# Patient Record
Sex: FEMALE | Race: WHITE | NOT HISPANIC OR LATINO
[De-identification: names, ages, dates, MRNs, and addresses within clinical notes are randomized per-mention and may not be internally consistent; named-entity substitution may affect disease eponyms.]

---

## 2017-07-31 ENCOUNTER — APPOINTMENT (OUTPATIENT)
Dept: ORTHOPEDIC SURGERY | Facility: CLINIC | Age: 27
End: 2017-07-31
Payer: COMMERCIAL

## 2017-07-31 VITALS — BODY MASS INDEX: 26.98 KG/M2 | HEIGHT: 68 IN | WEIGHT: 178 LBS

## 2017-07-31 VITALS — DIASTOLIC BLOOD PRESSURE: 80 MMHG | HEART RATE: 76 BPM | SYSTOLIC BLOOD PRESSURE: 120 MMHG

## 2017-07-31 DIAGNOSIS — M54.5 LOW BACK PAIN: ICD-10-CM

## 2017-07-31 PROCEDURE — 99214 OFFICE O/P EST MOD 30 MIN: CPT

## 2017-08-02 ENCOUNTER — CLINICAL ADVICE (OUTPATIENT)
Age: 27
End: 2017-08-02

## 2017-08-28 ENCOUNTER — APPOINTMENT (OUTPATIENT)
Dept: ORTHOPEDIC SURGERY | Facility: CLINIC | Age: 27
End: 2017-08-28
Payer: COMMERCIAL

## 2017-08-28 DIAGNOSIS — Z78.9 OTHER SPECIFIED HEALTH STATUS: ICD-10-CM

## 2017-08-28 DIAGNOSIS — Z87.39 PERSONAL HISTORY OF OTHER DISEASES OF THE MUSCULOSKELETAL SYSTEM AND CONNECTIVE TISSUE: ICD-10-CM

## 2017-08-28 PROCEDURE — 99214 OFFICE O/P EST MOD 30 MIN: CPT

## 2017-08-28 RX ORDER — MULTIVITAMIN
TABLET ORAL
Refills: 0 | Status: ACTIVE | COMMUNITY

## 2019-07-12 ENCOUNTER — TRANSCRIPTION ENCOUNTER (OUTPATIENT)
Age: 29
End: 2019-07-12

## 2019-07-12 ENCOUNTER — RECORD ABSTRACTING (OUTPATIENT)
Age: 29
End: 2019-07-12

## 2019-07-12 ENCOUNTER — APPOINTMENT (OUTPATIENT)
Dept: INTERNAL MEDICINE | Facility: CLINIC | Age: 29
End: 2019-07-12
Payer: COMMERCIAL

## 2019-07-12 VITALS
SYSTOLIC BLOOD PRESSURE: 106 MMHG | WEIGHT: 184 LBS | HEART RATE: 77 BPM | DIASTOLIC BLOOD PRESSURE: 78 MMHG | HEIGHT: 68 IN | BODY MASS INDEX: 27.89 KG/M2

## 2019-07-12 DIAGNOSIS — K58.9 IRRITABLE BOWEL SYNDROME W/OUT DIARRHEA: ICD-10-CM

## 2019-07-12 DIAGNOSIS — Z83.3 FAMILY HISTORY OF DIABETES MELLITUS: ICD-10-CM

## 2019-07-12 DIAGNOSIS — Z82.49 FAMILY HISTORY OF ISCHEMIC HEART DISEASE AND OTHER DISEASES OF THE CIRCULATORY SYSTEM: ICD-10-CM

## 2019-07-12 DIAGNOSIS — Z83.438 FAMILY HISTORY OF OTHER DISORDER OF LIPOPROTEIN METABOLISM AND OTHER LIPIDEMIA: ICD-10-CM

## 2019-07-12 DIAGNOSIS — Z83.2 FAMILY HISTORY OF DISEASES OF THE BLOOD AND BLOOD-FORMING ORGANS AND CERTAIN DISORDERS INVOLVING THE IMMUNE MECHANISM: ICD-10-CM

## 2019-07-12 DIAGNOSIS — Z86.79 PERSONAL HISTORY OF OTHER DISEASES OF THE CIRCULATORY SYSTEM: ICD-10-CM

## 2019-07-12 DIAGNOSIS — D72.829 ELEVATED WHITE BLOOD CELL COUNT, UNSPECIFIED: ICD-10-CM

## 2019-07-12 PROCEDURE — 99213 OFFICE O/P EST LOW 20 MIN: CPT | Mod: 25

## 2019-07-12 PROCEDURE — 36415 COLL VENOUS BLD VENIPUNCTURE: CPT

## 2019-07-12 NOTE — HISTORY OF PRESENT ILLNESS
[FreeTextEntry1] : High WBC  [de-identified] : Had blood work done at [facility] and WBC was 11,000 with a normal of up to 10, 800. All other blood work was normal including CRP. She feels fine but is going on vaction next week and will be trying to conceive--sees Dr. Amy Nolasco. \par

## 2019-07-12 NOTE — PHYSICAL EXAM
[No Acute Distress] : no acute distress [Normal Sclera/Conjunctiva] : normal sclera/conjunctiva [Well Nourished] : well nourished [Normal Oropharynx] : the oropharynx was normal [No JVD] : no jugular venous distention [Normal TMs] : both tympanic membranes were normal [Supple] : supple [No Lymphadenopathy] : no lymphadenopathy [No Respiratory Distress] : no respiratory distress  [No Accessory Muscle Use] : no accessory muscle use [Clear to Auscultation] : lungs were clear to auscultation bilaterally [Normal Rate] : normal rate  [Regular Rhythm] : with a regular rhythm [Normal S1, S2] : normal S1 and S2 [No Edema] : there was no peripheral edema [Soft] : abdomen soft [Non Tender] : non-tender [No HSM] : no HSM [Non-distended] : non-distended [Normal Axillary Nodes] : no axillary lymphadenopathy [Normal Supraclavicular Nodes] : no supraclavicular lymphadenopathy [Normal Anterior Cervical Nodes] : no anterior cervical lymphadenopathy [Normal Femoral Nodes] : no femoral lymphadenopathy [Speech Grossly Normal] : speech grossly normal [Normal] : no rash [Normal Mood] : the mood was normal

## 2019-07-12 NOTE — REVIEW OF SYSTEMS
[Fever] : no fever [Chills] : no chills [Fatigue] : no fatigue [Earache] : no earache [Sore Throat] : no sore throat [Chest Pain] : no chest pain [Postnasal Drip] : postnasal drip [Shortness Of Breath] : no shortness of breath [Palpitations] : no palpitations [Cough] : no cough [Wheezing] : no wheezing [Constipation] : no constipation [Abdominal Pain] : no abdominal pain [Nausea] : no nausea [Vomiting] : no vomiting [Dysuria] : no dysuria [Melena] : no melena [Incontinence] : no incontinence [Nocturia] : no nocturia [Frequency] : no frequency [Itching] : no itching [Easy Bleeding] : no easy bleeding

## 2019-07-14 LAB
BASOPHILS # BLD AUTO: 0.04 K/UL
BASOPHILS NFR BLD AUTO: 0.4 %
EOSINOPHIL # BLD AUTO: 0.24 K/UL
EOSINOPHIL NFR BLD AUTO: 2.4 %
HCT VFR BLD CALC: 41 %
HGB BLD-MCNC: 12.9 G/DL
IMM GRANULOCYTES NFR BLD AUTO: 0.2 %
LYMPHOCYTES # BLD AUTO: 2.7 K/UL
LYMPHOCYTES NFR BLD AUTO: 27.1 %
MAN DIFF?: NORMAL
MCHC RBC-ENTMCNC: 29.5 PG
MCHC RBC-ENTMCNC: 31.5 GM/DL
MCV RBC AUTO: 93.8 FL
MONOCYTES # BLD AUTO: 0.44 K/UL
MONOCYTES NFR BLD AUTO: 4.4 %
NEUTROPHILS # BLD AUTO: 6.52 K/UL
NEUTROPHILS NFR BLD AUTO: 65.5 %
PLATELET # BLD AUTO: 343 K/UL
RBC # BLD: 4.37 M/UL
RBC # FLD: 12 %
WBC # FLD AUTO: 9.96 K/UL

## 2019-07-29 ENCOUNTER — APPOINTMENT (OUTPATIENT)
Dept: ORTHOPEDIC SURGERY | Facility: CLINIC | Age: 29
End: 2019-07-29
Payer: COMMERCIAL

## 2019-07-29 PROCEDURE — 99214 OFFICE O/P EST MOD 30 MIN: CPT

## 2019-07-29 NOTE — DISCUSSION/SUMMARY
[de-identified] : Lumbar radiculopathy \par We discussed all options.\par She will continue her course of Medrol as of right now \par She will start a Medrol pack \par MDP Rx given \par Mom and pt agree. \par F/U in 2 weeks \par All options discussed including rest, medicine, home exercise, acupuncture, Chiropractic care, Physical Therapy, Pain management, and last resort surgery. \par All questions were answered, all alternatives discussed and the patient is in complete agreement with that plan. Follow-up appointment as instructed. Any issues and the patient will call or come in sooner.

## 2019-07-29 NOTE — PHYSICAL EXAM
[Normal] : Gait: normal [UE/LE] : Sensory: Intact in bilateral upper & lower extremities [ALL] : dorsalis pedis, posterior tibial, femoral, popliteal, and radial 2+ and symmetric bilaterally [de-identified] : 5 out of 5 motor strength, sensation is intact and symmetrical full range of motion flexion extension and rotation, no palpatory tenderness full range of motion of hips knees shoulders and elbows (all four extremities), no atrophy, negative straight leg raise, no pathological reflexes, no swelling, normal ambulation, no apparent distress skin is intact, no palpable lymph nodes, no upper or lower extremity instability, alert and oriented x3 and normal mood. Normal finger-to nose test.  [de-identified] : Report only 7/25/19 MRI [facility] MRI lumbar: Acute appearing left paracentral extrusion with caudad extension at L4-5 impinging the left L5 nerve root with left lateral recesses stenosis, disc bulging and left greater than right neural foraminal narrowing and facet hypertrophy\par MRI lumbar-same L4-5 left HNP-reviewed with the pain.\par Same as MRI in 2014.

## 2019-07-29 NOTE — HISTORY OF PRESENT ILLNESS
[Stable] : stable [de-identified] : Right sided lower back pain for 5 days improving \par The patient states that she went on her honeymoon and needed an emergency flight back due to the pain\par Complaining of numbness radiating down the left leg \par Tried Medrol- helping \par No fever chills sweats nausea vomiting no bowel or bladder dysfunction, no recent weight loss or gain no night pain. This history is in addition to the intake form that I personally reviewed.

## 2019-08-07 ENCOUNTER — APPOINTMENT (OUTPATIENT)
Dept: ORTHOPEDIC SURGERY | Facility: CLINIC | Age: 29
End: 2019-08-07

## 2019-10-25 ENCOUNTER — EMERGENCY (EMERGENCY)
Facility: HOSPITAL | Age: 29
LOS: 1 days | Discharge: HOME | End: 2019-10-25
Attending: EMERGENCY MEDICINE
Payer: COMMERCIAL

## 2019-10-25 VITALS
SYSTOLIC BLOOD PRESSURE: 125 MMHG | OXYGEN SATURATION: 97 % | RESPIRATION RATE: 16 BRPM | DIASTOLIC BLOOD PRESSURE: 82 MMHG | WEIGHT: 184.97 LBS | HEIGHT: 65 IN | HEART RATE: 68 BPM | TEMPERATURE: 98 F

## 2019-10-25 PROCEDURE — 99284 EMERGENCY DEPT VISIT MOD MDM: CPT

## 2019-10-25 RX ORDER — ACETAMINOPHEN 500 MG
975 TABLET ORAL ONCE
Refills: 0 | Status: COMPLETED | OUTPATIENT
Start: 2019-10-25 | End: 2019-10-25

## 2019-10-25 RX ORDER — IBUPROFEN 200 MG
600 TABLET ORAL ONCE
Refills: 0 | Status: COMPLETED | OUTPATIENT
Start: 2019-10-25 | End: 2019-10-25

## 2019-10-25 RX ORDER — DIAZEPAM 5 MG
5 TABLET ORAL ONCE
Refills: 0 | Status: DISCONTINUED | OUTPATIENT
Start: 2019-10-25 | End: 2019-10-25

## 2019-10-25 RX ORDER — OXYCODONE HYDROCHLORIDE 5 MG/1
5 TABLET ORAL ONCE
Refills: 0 | Status: DISCONTINUED | OUTPATIENT
Start: 2019-10-25 | End: 2019-10-25

## 2019-10-25 RX ORDER — LIDOCAINE 4 G/100G
1 CREAM TOPICAL ONCE
Refills: 0 | Status: COMPLETED | OUTPATIENT
Start: 2019-10-25 | End: 2019-10-25

## 2019-10-25 RX ADMIN — Medication 5 MILLIGRAM(S): at 22:25

## 2019-10-25 RX ADMIN — Medication 600 MILLIGRAM(S): at 22:25

## 2019-10-25 NOTE — ED ADULT NURSE NOTE - NSIMPLEMENTINTERV_GEN_ALL_ED
Implemented All Fall Risk Interventions:  Rea to call system. Call bell, personal items and telephone within reach. Instruct patient to call for assistance. Room bathroom lighting operational. Non-slip footwear when patient is off stretcher. Physically safe environment: no spills, clutter or unnecessary equipment. Stretcher in lowest position, wheels locked, appropriate side rails in place. Provide visual cue, wrist band, yellow gown, etc. Monitor gait and stability. Monitor for mental status changes and reorient to person, place, and time. Review medications for side effects contributing to fall risk. Reinforce activity limits and safety measures with patient and family.

## 2019-10-25 NOTE — ED PROVIDER NOTE - NS ED ROS FT
General: no fevers or chills  Head: no headache or lightheadedness  Eyes: no vision change  ENT: no nasal discharge/congestion  CV: no chest pain  Resp: no SOB  GI: +nausea, no vomiting, no abdominal pain  : no dysuria, no hematuria, no vaginal discharge  MSK: +lower back pain  Skin: no new rash  Neuro: no focal weakness, no change in sensation

## 2019-10-25 NOTE — ED ADULT NURSE NOTE - OBJECTIVE STATEMENT
29 yo F aaox4 PMH chronic lower back pain and L4-L5 herniation. C/o of lower back pain, LE weakness,   and severe pain s/p "massage" she received tonight. Unable to bear any weight. Sensation intact mostly throughout, but   numbness noted to Left foot. pt reports pain has become severely worst. Recently on prednisone was tapered off last month . Now take   Malaxone x 3 days. No sign of acute distress. Denies CP dizziness weakness or sob. Denies loss of bladder  function.

## 2019-10-25 NOTE — ED PROVIDER NOTE - NSFOLLOWUPINSTRUCTIONS_ED_ALL_ED_FT
Please follow up with your neurologist in the next few days. Call Dr. Osborn to make an appointment. You should obtain a thoracic and lumbar spine MRI for evaluation.     Take the prednisone as prescribed.  Day 1: 6 tablets  Day 2: 5 tablets  Day 3: 4 tablets  Day 4: 3 tablets  Day 5: 2 tablets  Day 6: 1 tablet    Take 650mg tylenol every 6 hours as needed for pain.  Take 600mg ibuprofen every 6 hours as needed for pain, make sure to take with food. Do not take with meloxicam.   Use a lidocaine patch (salonpas) on the area for up to 12 hours at a time as needed.     You can take oxycodone 5mg up to every 4 hours as needed for pain if pain not controlled with tylenol and ibuprofen.    You can apply heat compress for 20 to 30 minutes every 2 hours. Heat can help decrease pain and muscle spasms.     Stretch the back muscles as much as possible when pain is controlled.    Continue all other home medications as prescribed.    Return to the ED for any worsening symptoms of worsening pain, weakness or change in sensation of legs, incontinence of urine or feces, numbness in groin, or any new or concerning symptoms.    Please read all attached. Please follow up with your neurologist in the next few days. Call Dr. Osborn to make an appointment. You should obtain a thoracic and lumbar spine MRI for evaluation.     You should call [phone] to be evaluate at the spine clinic.    Take the prednisone as prescribed.  Day 1: 6 tablets  Day 2: 5 tablets  Day 3: 4 tablets  Day 4: 3 tablets  Day 5: 2 tablets  Day 6: 1 tablet    Take 650mg tylenol every 6 hours as needed for pain.  Take 600mg ibuprofen every 6 hours as needed for pain, make sure to take with food. Do not take with meloxicam.   Use a lidocaine patch (salonpas) on the area for up to 12 hours at a time as needed.     You can take oxycodone 5mg up to every 4 hours as needed for pain if pain not controlled with tylenol and ibuprofen.    You can apply heat compress for 20 to 30 minutes every 2 hours. Heat can help decrease pain and muscle spasms.     Stretch the back muscles as much as possible when pain is controlled.    Continue all other home medications as prescribed.    Return to the ED for any worsening symptoms of worsening pain, weakness or change in sensation of legs, incontinence of urine or feces, numbness in groin, or any new or concerning symptoms.    Please read all attached.

## 2019-10-25 NOTE — ED ADULT NURSE NOTE - CHPI ED NUR SYMPTOMS NEG
no tingling/no bladder dysfunction/no anorexia/no motor function loss/no neck tenderness/no numbness/no bowel dysfunction/no constipation/no fatigue

## 2019-10-25 NOTE — ED PROVIDER NOTE - PATIENT PORTAL LINK FT
You can access the FollowMyHealth Patient Portal offered by [facility] by registering at the following website: [email or web address]. By joining [employer]’s FollowMyHealth portal, you will also be able to view your health information using other applications (apps) compatible with our system.

## 2019-10-25 NOTE — ED PROVIDER NOTE - PROGRESS NOTE DETAILS
Jina Angulo, resident MD: pt continued to have back pain despite multiple doses of pain medication and pt's desire to wait in between doses. pt was able to stand with assistance but unable to walk due to pain. will obtain labs and consult neurology for further recommendations as she is being followed by neurologist outpt. neurology was consulted. Jina Angulo, resident MD: neurology evaluated pt and recommends outpt MRI for thoracic and lumbar spine as well as a steroid taper and outpt f/u with Dr. Osborn. pt's pain is better controlled after morphine and is able to ambulate with minimal assistance. will discharge patient home at this time. printed out copies of results for patient to take home. discussed return precautions and need for outpatient follow up.

## 2019-10-25 NOTE — ED PROVIDER NOTE - OBJECTIVE STATEMENT
27yo woman PMH L4-5 disc herniation presents with acute back pain x 3 days. Pt reports that she had worsening of back pain 3 days ago and was seen by neurologist who had given her a shot which improved pain. She then had flare of pain and [facility] and had worsening of pain afterwards with inability to move lower extremities due to worsening pain in lower back. No fevers or chills, no bowel/bladder incontinence, no saddle anesthesia, and no numbness/weakness to extremities. No dysuria, no hematuria. No history of IVDU. No rash.  She had 1 episode of nausea with the pain but no vomiting or persistence of nausea.

## 2019-10-25 NOTE — ED PROVIDER NOTE - ATTENDING CONTRIBUTION TO CARE
pt is a 29 y/o female with herniated disc lspine region with no radiation of pain mri in july, massage this evening with worsening back pain, nvi to le, pain control, no change in bowel or bladder habits. analgesia ordered, reassess.

## 2019-10-25 NOTE — ED PROVIDER NOTE - PHYSICAL EXAMINATION
General: uncomfortable appearing woman in mild distress due to pain  Head: normocephalic, atraumatic  Eyes: clear eyes  Mouth: moist mucous membranes  Neck: supple neck  CV: normal rate and rhythm, no LE edema, peripheral pulses 2+ bilaterally  Respiratory: clear to auscultation bilaterally  Abdomen: soft, nontender, nondistended, has back brace on  : no suprapubic tenderness, no CVAT  MSK: back tenderness with palpation and pain with LE movement  Neuro: alert and oriented x3, CN II-XII intact, speech clear, strength 5/5 UE bilaterally and moving LE bilaterally spontaneously but limited strength due to pain in back, sensation equal and intact bilaterally  Extremities: no tenderness to palpation of joints of LE

## 2019-10-25 NOTE — ED PROVIDER NOTE - CARE PROVIDER_API CALL
Nathan Osborn (MD)  Clinical Neurophysiology; Neurology  [address] [place] [address]  Phone: [phone]  Fax: [phone]  Follow Up Time:

## 2019-10-26 VITALS
DIASTOLIC BLOOD PRESSURE: 70 MMHG | OXYGEN SATURATION: 100 % | TEMPERATURE: 99 F | RESPIRATION RATE: 16 BRPM | HEART RATE: 64 BPM | SYSTOLIC BLOOD PRESSURE: 127 MMHG

## 2019-10-26 LAB
ALBUMIN SERPL ELPH-MCNC: 4.3 G/DL — SIGNIFICANT CHANGE UP (ref 3.3–5)
ALP SERPL-CCNC: 50 U/L — SIGNIFICANT CHANGE UP (ref 40–120)
ALT FLD-CCNC: 15 U/L — SIGNIFICANT CHANGE UP (ref 10–45)
ANION GAP SERPL CALC-SCNC: 13 MMOL/L — SIGNIFICANT CHANGE UP (ref 5–17)
AST SERPL-CCNC: 13 U/L — SIGNIFICANT CHANGE UP (ref 10–40)
BASOPHILS # BLD AUTO: 0.03 K/UL — SIGNIFICANT CHANGE UP (ref 0–0.2)
BASOPHILS NFR BLD AUTO: 0.3 % — SIGNIFICANT CHANGE UP (ref 0–2)
BILIRUB SERPL-MCNC: 0.6 MG/DL — SIGNIFICANT CHANGE UP (ref 0.2–1.2)
BUN SERPL-MCNC: 11 MG/DL — SIGNIFICANT CHANGE UP (ref 7–23)
CALCIUM SERPL-MCNC: 9.6 MG/DL — SIGNIFICANT CHANGE UP (ref 8.4–10.5)
CHLORIDE SERPL-SCNC: 104 MMOL/L — SIGNIFICANT CHANGE UP (ref 96–108)
CO2 SERPL-SCNC: 25 MMOL/L — SIGNIFICANT CHANGE UP (ref 22–31)
CREAT SERPL-MCNC: 0.66 MG/DL — SIGNIFICANT CHANGE UP (ref 0.5–1.3)
EOSINOPHIL # BLD AUTO: 0.08 K/UL — SIGNIFICANT CHANGE UP (ref 0–0.5)
EOSINOPHIL NFR BLD AUTO: 0.7 % — SIGNIFICANT CHANGE UP (ref 0–6)
GLUCOSE SERPL-MCNC: 87 MG/DL — SIGNIFICANT CHANGE UP (ref 70–99)
HCT VFR BLD CALC: 40 % — SIGNIFICANT CHANGE UP (ref 34.5–45)
HGB BLD-MCNC: 13.6 G/DL — SIGNIFICANT CHANGE UP (ref 11.5–15.5)
IMM GRANULOCYTES NFR BLD AUTO: 0.4 % — SIGNIFICANT CHANGE UP (ref 0–1.5)
LYMPHOCYTES # BLD AUTO: 26.6 % — SIGNIFICANT CHANGE UP (ref 13–44)
LYMPHOCYTES # BLD AUTO: 3.06 K/UL — SIGNIFICANT CHANGE UP (ref 1–3.3)
MCHC RBC-ENTMCNC: 29.7 PG — SIGNIFICANT CHANGE UP (ref 27–34)
MCHC RBC-ENTMCNC: 34 GM/DL — SIGNIFICANT CHANGE UP (ref 32–36)
MCV RBC AUTO: 87.3 FL — SIGNIFICANT CHANGE UP (ref 80–100)
MONOCYTES # BLD AUTO: 0.67 K/UL — SIGNIFICANT CHANGE UP (ref 0–0.9)
MONOCYTES NFR BLD AUTO: 5.8 % — SIGNIFICANT CHANGE UP (ref 2–14)
NEUTROPHILS # BLD AUTO: 7.62 K/UL — HIGH (ref 1.8–7.4)
NEUTROPHILS NFR BLD AUTO: 66.2 % — SIGNIFICANT CHANGE UP (ref 43–77)
NRBC # BLD: 0 /100 WBCS — SIGNIFICANT CHANGE UP (ref 0–0)
PLATELET # BLD AUTO: 290 K/UL — SIGNIFICANT CHANGE UP (ref 150–400)
POTASSIUM SERPL-MCNC: 3.9 MMOL/L — SIGNIFICANT CHANGE UP (ref 3.5–5.3)
POTASSIUM SERPL-SCNC: 3.9 MMOL/L — SIGNIFICANT CHANGE UP (ref 3.5–5.3)
PROT SERPL-MCNC: 7.1 G/DL — SIGNIFICANT CHANGE UP (ref 6–8.3)
RBC # BLD: 4.58 M/UL — SIGNIFICANT CHANGE UP (ref 3.8–5.2)
RBC # FLD: 11.7 % — SIGNIFICANT CHANGE UP (ref 10.3–14.5)
SODIUM SERPL-SCNC: 142 MMOL/L — SIGNIFICANT CHANGE UP (ref 135–145)
WBC # BLD: 11.51 K/UL — HIGH (ref 3.8–10.5)
WBC # FLD AUTO: 11.51 K/UL — HIGH (ref 3.8–10.5)

## 2019-10-26 PROCEDURE — 96374 THER/PROPH/DIAG INJ IV PUSH: CPT

## 2019-10-26 PROCEDURE — 99284 EMERGENCY DEPT VISIT MOD MDM: CPT | Mod: 25

## 2019-10-26 PROCEDURE — 85027 COMPLETE CBC AUTOMATED: CPT

## 2019-10-26 PROCEDURE — 80053 COMPREHEN METABOLIC PANEL: CPT

## 2019-10-26 RX ORDER — OXYCODONE HYDROCHLORIDE 5 MG/1
1 TABLET ORAL
Qty: 18 | Refills: 0
Start: 2019-10-26 | End: 2019-10-28

## 2019-10-26 RX ORDER — OXYCODONE HYDROCHLORIDE 5 MG/1
5 TABLET ORAL ONCE
Refills: 0 | Status: DISCONTINUED | OUTPATIENT
Start: 2019-10-26 | End: 2019-10-26

## 2019-10-26 RX ORDER — MORPHINE SULFATE 50 MG/1
4 CAPSULE, EXTENDED RELEASE ORAL ONCE
Refills: 0 | Status: DISCONTINUED | OUTPATIENT
Start: 2019-10-26 | End: 2019-10-26

## 2019-10-26 RX ADMIN — Medication 975 MILLIGRAM(S): at 00:07

## 2019-10-26 RX ADMIN — MORPHINE SULFATE 4 MILLIGRAM(S): 50 CAPSULE, EXTENDED RELEASE ORAL at 04:45

## 2019-10-26 RX ADMIN — OXYCODONE HYDROCHLORIDE 5 MILLIGRAM(S): 5 TABLET ORAL at 00:40

## 2019-10-26 RX ADMIN — OXYCODONE HYDROCHLORIDE 5 MILLIGRAM(S): 5 TABLET ORAL at 02:09

## 2019-10-26 NOTE — CONSULT NOTE ADULT - SUBJECTIVE AND OBJECTIVE BOX
HPI:  Patient is a 28 year old female with PMHx of L4-L5 disc herniation 5+ years prior (was lifting patient and injured back) who presents with CC of lower back pain for 3 days. Previously, in July she saw Dr Osborn who prescribed her a long course prednisone taper which helped with back pain in addition to Mobic. 3 days ago, she got trigger point injections, and back pain initially improved, but then it began to get worse again and she went to a [facility], and afterwards began having significantly worse pain associated with difficulty walking due to muscle tension. She denies any numbness or weakness, inability to walk is pain limited. Patient denies bowel or bladder incontinence or perineal or perianal numbness, and she does not have truncal numbness or weakness/numbness in arms. No headache or vision changes. She is currently wearing a binder.   No hx of IVDU.     PAST MEDICAL & SURGICAL HISTORY:  No pertinent past medical history  No significant past surgical history    MEDICATIONS (HOME):  Home Medications:  birth control pill:   once a day (26 Feb 2016 20:34)    ALLERGIES/INTOLERANCES:  Allergies  No Known Allergies    VITALS & EXAMINATION:  Vital Signs Last 24 Hrs  T(C): 37.3 (26 Oct 2019 04:44), Max: 37.3 (26 Oct 2019 04:44)  T(F): 99.2 (26 Oct 2019 04:44), Max: 99.2 (26 Oct 2019 04:44)  HR: 64 (26 Oct 2019 04:44) (64 - 68)  BP: 127/70 (26 Oct 2019 04:44) (120/50 - 127/70)  BP(mean): --  RR: 16 (26 Oct 2019 04:44) (16 - 16)  SpO2: 100% (26 Oct 2019 04:44) (97% - 100%)    General:  Constitutional: Female, appears stated age, appears in mild-mod pain  Head: Normocephalic & atraumatic.  Neurological (>12):  MS: Awake, alert, oriented to person, place, situation, time. Normal affect. Follows all commands.    Language: Speech is clear, fluent with good repetition & comprehension    CNs: PERRLA (R = 3mm, L = 3mm). VFF. EOMI no nystagmus, no diplopia. V1-3 intact to LT/pinprick, well developed masseter muscles b/l. No facial asymmetry b/l, full eye closure strength b/l. Head turning & shoulder shrug intact b/l.     Motor: Normal muscle bulk & tone. No noticeable tremor or seizure. No pronator drift.              Deltoid	Biceps	Triceps	Wrist	   R	5	5	5	5	5 	  L	5	5	5	5	5    	H-Flex	H-Ext	K-Flex	K-Ext	D-Flex	P-Flex  R	5	5	5	5	5	5 	   L	5	5	5	5	5	5	     Sensation: Intact to Light touch throughout  Has lower back paraspinal tenderness, no bony tenderness, no step-offs.   Cortical: Extinction on DSS (neglect): none    Reflexes:              Biceps(C5)       BR(C6)     Triceps(C7)               Patellar(L4)     Plantar Resp  R	2	          2	             2		        2		   Down   L	2	          2	             2		        2		   Down     Coordination: intact rapid-alt movements. No dysmetria to FTN/HTS  Gait: Cautious but normal gait, able to walk unassisted.     LABORATORY:  CBC                       13.6   11.51 )-----------( 290      ( 26 Oct 2019 04:21 )             40.0     Chem 10-26    142  |  104  |  11  ----------------------------<  87  3.9   |  25  |  0.66    Ca    9.6      26 Oct 2019 04:21

## 2019-10-26 NOTE — CONSULT NOTE ADULT - ASSESSMENT
28 year old female with PMHx of L4-L5 disc herniation 5+ years prior (was lifting patient and injured back) who presents with CC of lower back pain for 3 days. Previously, in July she saw Dr Osborn who prescribed her a long course prednisone taper which helped with back pain in addition to Mobic. 3 days ago, she got trigger point injections, and back pain initially improved, but then it began to get worse again and she went to a [facility], and afterwards began having significantly worse pain associated with difficulty walking due to muscle tension. She denies any numbness or weakness, inability to walk is pain limited. Patient denies bowel or bladder incontinence or perineal or perianal numbness, and she does not have truncal numbness or weakness/numbness in arms. No headache or vision changes. She is currently wearing a binder.   No hx of IVDU.   Neurologic exam notable for lower back paraspinal tenderness, no bony tenderness, no step-offs. No alarming sx, and sx are improving with IV pain medication.    Plan  [] Prednisone taper 60, 50, 40, 30, 20, 10  [] outpatient MRI T and L spine without contrast  [] close follow up outpatient with Dr Osborn and Dr Mercedes (for further opinion on conservative vs surgical management once imaging is done)  [] Patient informed to not lift heavy objects, avoid massages and to come back to ED if symptoms worsen again or she develops new symptoms.     D/w Dr Pavon

## 2020-01-03 ENCOUNTER — TRANSCRIPTION ENCOUNTER (OUTPATIENT)
Age: 30
End: 2020-01-03

## 2020-03-11 ENCOUNTER — APPOINTMENT (OUTPATIENT)
Dept: ORTHOPEDIC SURGERY | Facility: CLINIC | Age: 30
End: 2020-03-11
Payer: COMMERCIAL

## 2020-03-11 ENCOUNTER — APPOINTMENT (OUTPATIENT)
Dept: SPINE | Facility: CLINIC | Age: 30
End: 2020-03-11

## 2020-03-11 VITALS
SYSTOLIC BLOOD PRESSURE: 124 MMHG | DIASTOLIC BLOOD PRESSURE: 78 MMHG | WEIGHT: 183 LBS | BODY MASS INDEX: 27.74 KG/M2 | HEART RATE: 88 BPM | HEIGHT: 68 IN

## 2020-03-11 DIAGNOSIS — M54.16 RADICULOPATHY, LUMBAR REGION: ICD-10-CM

## 2020-03-11 PROCEDURE — 99214 OFFICE O/P EST MOD 30 MIN: CPT

## 2020-03-11 NOTE — HISTORY OF PRESENT ILLNESS
[de-identified] : Ms. TIMMY RIVERA  is a 29 year old female who presents with a chronic intermittent history of left lumbar radiculopathy.  She has had a recent flare-up.  Her pain is in the left buttock, hamstring, and foot. Normal bowel and bladder control.   Denies any recent fevers, chills, sweats, weight loss, or infection.  She has an appointment with a pain management MD today.   She has had multiple rounds of steroids for the pain with temporary relief. She also has new onset cervical radiculopathy with complaints of weakness in the arm\par \par The patients past medical history, past surgical history, medications, allergies, and social history were reviewed by me today with the patient and documented accordingly.  In addition, the patient's family history, which is noncontributory to their visit, was also reviewed.\par

## 2020-03-11 NOTE — DISCUSSION/SUMMARY
[de-identified] : We discussed further treatment options. She is currently scheduled to be evaluated for epidural injections. She is also complaining of increased arm pain with weakness. Will obtain a cervical spine MRI. Followup after.

## 2020-03-11 NOTE — PHYSICAL EXAM
[Antalgic] : antalgic [de-identified] : Examination of the cervical spine reveals no midline or paraspinal tenderness to palpation. No cervical lymphadenopathy. Decreased range of motion with respect to flexion, extension, rotation, and lateral bending. Negative Spurlings. Negative Lhermitte's. Full range of motion bilateral shoulders without evidence of impingement. No instability of bilateral upper extremities.  Cranial nerves II through XII grossly intact. Intact sensation bilateral upper extremities. 5/5 deltoids biceps triceps wrist extensors wrist flexors finger flexors and hand intrinsics. 1+ biceps triceps and brachioradialis reflexes. Negative Adame's. 2+ radial pulse. Negative Tinel's over the cubital and carpal tunnel. No skin lesions on the right and left upper extremities.\par \par Examination of the lumbar spine reveals no midline tenderness palpation, step-offs, or skin lesions. Decreased range of motion with respect to flexion, extension, lateral bending, and rotation. No tenderness to palpation of the sciatic notch. No tenderness palpation of the bilateral greater trochanters. No pain with passive internal/external rotation of the hips. No instability of bilateral lower extremities.  Negative CARA. Positive straight leg raise bilaterally. No bowstring. Negative femoral stretch. 5 out of 5 iliopsoas, hip abductors, hips adductors, quadriceps, hamstrings, gastrocsoleus, tibialis anterior, extensor hallucis longus, peroneals. Grossly intact sensation to light touch bilateral lower extremities. 1+ patellar and Achilles reflexes. Downgoing Babinski. No clonus. Intact proprioception. Palpable pulses. No skin lesion and no edema on the right and left lower extremities. [de-identified] : Lumbar spine MRI reveals L4-5 degenerative changes with a large left-sided disc extrusion with stenosis

## 2020-04-27 ENCOUNTER — APPOINTMENT (OUTPATIENT)
Dept: ORTHOPEDIC SURGERY | Facility: CLINIC | Age: 30
End: 2020-04-27
Payer: COMMERCIAL

## 2020-04-27 PROCEDURE — 99214 OFFICE O/P EST MOD 30 MIN: CPT | Mod: 95

## 2020-04-27 NOTE — HISTORY OF PRESENT ILLNESS
[de-identified] : Ms. TIMMY RIVERA  is a 29 year old female who presents for followup today. She reports that her leg symptoms have improved. She did not have an epidural duty cancellations. She will get some pain along her spine at times at night but otherwise is feeling much improved. She was also unable to obtain a cervical spine MRI.

## 2020-04-27 NOTE — DISCUSSION/SUMMARY
[de-identified] : Overall her symptoms have improved with regards her lumbar spine. She will try some over-the-counter medications. She is overall concerned about the condition of her spine and is trying to get pregnant. She was a total spine MRI for further workup. This will be ordered and performed pending insurance authorization. Follow up afterwards.

## 2020-04-27 NOTE — REASON FOR VISIT
[Home] : at home, [unfilled] , at the time of the visit. [Medical Office: ([facility])___] : at the medical office located in  [Patient] : the patient

## 2020-04-27 NOTE — PHYSICAL EXAM
[Antalgic] : not antalgic [de-identified] : Examination of the cervical spine reveals no midline or paraspinal tenderness to palpation. No cervical lymphadenopathy. Decreased range of motion with respect to flexion, extension, rotation, and lateral bending. Negative Spurlings. Negative Lhermitte's. Full range of motion bilateral shoulders without evidence of impingement. No instability of bilateral upper extremities.  Cranial nerves II through XII grossly intact. Intact sensation bilateral upper extremities. 5/5 deltoids biceps triceps wrist extensors wrist flexors finger flexors and hand intrinsics. 1+ biceps triceps and brachioradialis reflexes. Negative Adame's. 2+ radial pulse. Negative Tinel's over the cubital and carpal tunnel. No skin lesions on the right and left upper extremities.\par \par Examination of the lumbar spine reveals no midline tenderness palpation, step-offs, or skin lesions. Decreased range of motion with respect to flexion, extension, lateral bending, and rotation. No tenderness to palpation of the sciatic notch. No tenderness palpation of the bilateral greater trochanters. No pain with passive internal/external rotation of the hips. No instability of bilateral lower extremities.  Negative CARA. Positive straight leg raise bilaterally. No bowstring. Negative femoral stretch. 5 out of 5 iliopsoas, hip abductors, hips adductors, quadriceps, hamstrings, gastrocsoleus, tibialis anterior, extensor hallucis longus, peroneals. Grossly intact sensation to light touch bilateral lower extremities. 1+ patellar and Achilles reflexes. Downgoing Babinski. No clonus. Intact proprioception. Palpable pulses. No skin lesion and no edema on the right and left lower extremities. [de-identified] : Lumbar spine MRI reveals L4-5 degenerative changes with a large left-sided disc extrusion with stenosis

## 2020-05-22 ENCOUNTER — APPOINTMENT (OUTPATIENT)
Dept: ORTHOPEDIC SURGERY | Facility: CLINIC | Age: 30
End: 2020-05-22
Payer: COMMERCIAL

## 2020-05-22 DIAGNOSIS — M48.07 SPINAL STENOSIS, LUMBOSACRAL REGION: ICD-10-CM

## 2020-05-22 DIAGNOSIS — M51.26 OTHER INTERVERTEBRAL DISC DISPLACEMENT, LUMBAR REGION: ICD-10-CM

## 2020-05-22 DIAGNOSIS — M51.36 OTHER INTERVERTEBRAL DISC DEGENERATION, LUMBAR REGION: ICD-10-CM

## 2020-05-22 PROCEDURE — 99214 OFFICE O/P EST MOD 30 MIN: CPT | Mod: 95

## 2020-05-22 NOTE — REASON FOR VISIT
[Home] : at home, [unfilled] , at the time of the visit. [Medical Office: ([facility])___] : at the medical office located in  [Patient] : the patient

## 2020-05-22 NOTE — HISTORY OF PRESENT ILLNESS
[de-identified] : Patient returns for followup today. Overall she is feeling much better. No complaints.

## 2020-05-22 NOTE — DISCUSSION/SUMMARY
[de-identified] : We again discussed further treatment options. Overall she is feeling much better. At this point she does not want to pursue further MRIs or other treatments. She will continue with home exercises and over-the-counter medications. She will let me over any changes or worsening of her symptoms.

## 2020-05-22 NOTE — PHYSICAL EXAM
[Antalgic] : not antalgic [de-identified] : Examination of the cervical spine reveals no midline or paraspinal tenderness to palpation. No cervical lymphadenopathy. Decreased range of motion with respect to flexion, extension, rotation, and lateral bending. Negative Spurlings. Negative Lhermitte's. Full range of motion bilateral shoulders without evidence of impingement. No instability of bilateral upper extremities.  Cranial nerves II through XII grossly intact. Intact sensation bilateral upper extremities. 5/5 deltoids biceps triceps wrist extensors wrist flexors finger flexors and hand intrinsics. 1+ biceps triceps and brachioradialis reflexes. Negative Adame's. 2+ radial pulse. Negative Tinel's over the cubital and carpal tunnel. No skin lesions on the right and left upper extremities.\par \par Examination of the lumbar spine reveals no midline tenderness palpation, step-offs, or skin lesions. Decreased range of motion with respect to flexion, extension, lateral bending, and rotation. No tenderness to palpation of the sciatic notch. No tenderness palpation of the bilateral greater trochanters. No pain with passive internal/external rotation of the hips. No instability of bilateral lower extremities.  Negative CARA. Positive straight leg raise bilaterally. No bowstring. Negative femoral stretch. 5 out of 5 iliopsoas, hip abductors, hips adductors, quadriceps, hamstrings, gastrocsoleus, tibialis anterior, extensor hallucis longus, peroneals. Grossly intact sensation to light touch bilateral lower extremities. 1+ patellar and Achilles reflexes. Downgoing Babinski. No clonus. Intact proprioception. Palpable pulses. No skin lesion and no edema on the right and left lower extremities. [de-identified] : Lumbar spine MRI reveals L4-5 degenerative changes with a large left-sided disc extrusion with stenosis

## 2020-06-15 ENCOUNTER — APPOINTMENT (OUTPATIENT)
Dept: SPINE | Facility: CLINIC | Age: 30
End: 2020-06-15

## 2020-11-18 ENCOUNTER — OUTPATIENT (OUTPATIENT)
Dept: OUTPATIENT SERVICES | Facility: HOSPITAL | Age: 30
LOS: 1 days | Discharge: HOME | End: 2020-11-18

## 2020-11-18 DIAGNOSIS — D64.9 ANEMIA, UNSPECIFIED: ICD-10-CM

## 2020-11-19 ENCOUNTER — RESULT REVIEW (OUTPATIENT)
Age: 30
End: 2020-11-19

## 2020-11-19 ENCOUNTER — APPOINTMENT (OUTPATIENT)
Dept: HEMATOLOGY ONCOLOGY | Facility: CLINIC | Age: 30
End: 2020-11-19
Payer: COMMERCIAL

## 2020-11-19 VITALS
OXYGEN SATURATION: 99 % | TEMPERATURE: 98 F | SYSTOLIC BLOOD PRESSURE: 146 MMHG | RESPIRATION RATE: 14 BRPM | DIASTOLIC BLOOD PRESSURE: 81 MMHG | WEIGHT: 207.23 LBS | BODY MASS INDEX: 31.41 KG/M2 | HEIGHT: 67.99 IN | HEART RATE: 94 BPM

## 2020-11-19 VITALS — DIASTOLIC BLOOD PRESSURE: 77 MMHG | SYSTOLIC BLOOD PRESSURE: 120 MMHG

## 2020-11-19 LAB
BASOPHILS # BLD AUTO: 0.04 K/UL — SIGNIFICANT CHANGE UP (ref 0–0.2)
BASOPHILS NFR BLD AUTO: 0.3 % — SIGNIFICANT CHANGE UP (ref 0–2)
EOSINOPHIL # BLD AUTO: 0.11 K/UL — SIGNIFICANT CHANGE UP (ref 0–0.5)
EOSINOPHIL NFR BLD AUTO: 0.9 % — SIGNIFICANT CHANGE UP (ref 0–6)
HCT VFR BLD CALC: 32.8 % — LOW (ref 34.5–45)
HGB BLD-MCNC: 11.1 G/DL — LOW (ref 11.5–15.5)
IMM GRANULOCYTES NFR BLD AUTO: 1.1 % — SIGNIFICANT CHANGE UP (ref 0–1.5)
LYMPHOCYTES # BLD AUTO: 16.1 % — SIGNIFICANT CHANGE UP (ref 13–44)
LYMPHOCYTES # BLD AUTO: 2.08 K/UL — SIGNIFICANT CHANGE UP (ref 1–3.3)
MCHC RBC-ENTMCNC: 30.2 PG — SIGNIFICANT CHANGE UP (ref 27–34)
MCHC RBC-ENTMCNC: 33.8 G/DL — SIGNIFICANT CHANGE UP (ref 32–36)
MCV RBC AUTO: 89.1 FL — SIGNIFICANT CHANGE UP (ref 80–100)
MONOCYTES # BLD AUTO: 0.58 K/UL — SIGNIFICANT CHANGE UP (ref 0–0.9)
MONOCYTES NFR BLD AUTO: 4.5 % — SIGNIFICANT CHANGE UP (ref 2–14)
NEUTROPHILS # BLD AUTO: 9.96 K/UL — HIGH (ref 1.8–7.4)
NEUTROPHILS NFR BLD AUTO: 77.1 % — HIGH (ref 43–77)
NRBC # BLD: 0 /100 WBCS — SIGNIFICANT CHANGE UP (ref 0–0)
PLATELET # BLD AUTO: 233 K/UL — SIGNIFICANT CHANGE UP (ref 150–400)
RBC # BLD: 3.68 M/UL — LOW (ref 3.8–5.2)
RBC # FLD: 12.1 % — SIGNIFICANT CHANGE UP (ref 10.3–14.5)
WBC # BLD: 12.91 K/UL — HIGH (ref 3.8–10.5)
WBC # FLD AUTO: 12.91 K/UL — HIGH (ref 3.8–10.5)

## 2020-11-19 PROCEDURE — 99205 OFFICE O/P NEW HI 60 MIN: CPT

## 2020-11-19 RX ORDER — ALBUTEROL SULFATE 90 UG/1
108 (90 BASE) AEROSOL, METERED RESPIRATORY (INHALATION)
Qty: 8 | Refills: 0 | Status: COMPLETED | COMMUNITY
Start: 2017-08-22 | End: 2020-11-19

## 2020-11-19 RX ORDER — METHYLPREDNISOLONE 4 MG/1
4 TABLET ORAL
Qty: 1 | Refills: 1 | Status: COMPLETED | COMMUNITY
Start: 2017-07-31 | End: 2020-11-19

## 2020-11-19 RX ORDER — METHYLPREDNISOLONE 4 MG/1
4 TABLET ORAL
Qty: 1 | Refills: 1 | Status: COMPLETED | COMMUNITY
Start: 2019-07-29 | End: 2020-11-19

## 2020-11-19 RX ORDER — AMOXICILLIN AND CLAVULANATE POTASSIUM 875; 125 MG/1; MG/1
875-125 TABLET, COATED ORAL
Qty: 20 | Refills: 0 | Status: COMPLETED | COMMUNITY
Start: 2017-08-22 | End: 2020-11-19

## 2020-11-19 RX ORDER — NORGESTIMATE AND ETHINYL ESTRADIOL 7DAYSX3 LO
0.18/0.215/0.25 KIT ORAL
Qty: 28 | Refills: 0 | Status: COMPLETED | COMMUNITY
Start: 2017-07-24 | End: 2020-11-19

## 2020-11-19 RX ORDER — PROMETHAZINE HYDROCHLORIDE AND DEXTROMETHORPHAN HYDROBROMIDE ORAL SOLUTION 15; 6.25 MG/5ML; MG/5ML
6.25-15 SOLUTION ORAL
Qty: 118 | Refills: 0 | Status: COMPLETED | COMMUNITY
Start: 2017-08-22 | End: 2020-11-19

## 2020-11-19 NOTE — PHYSICAL EXAM
[Fully active, able to carry on all pre-disease performance without restriction] : Status 0 - Fully active, able to carry on all pre-disease performance without restriction [Normal] : affect appropriate [de-identified] : tr edema le b/l no erythema, tenderness or significant pitting edema [de-identified] : gravid uterus

## 2020-11-19 NOTE — ASSESSMENT
[FreeTextEntry1] : This is a 29 year old female who is here to see me for heterozygous prothrombin gene mutation in the setting of pregnancy; she will be 28 weeks pregnant on Monday. She has not been on lovenox thus far.\par \par Iron deficiency:\par -ferritin of 17, her hb is 11\par -starting a PNV containing iron 2 weeks ago\par -continue and repeat cbc in 4 weeks, if decreasing can start ferrous sulfate 325mg daily\par -she will call me with cbc result\par \par Prothrombin gene mutation heterozygous\par -her mother has no history of DVT/PE, she has a history of late miscarraiges in the setting of apla, Ms. Vicente does not have any APLA positivity at this time; given lack of dvt in the past and her mother does not seem to have any prothrombin gene mutation related thrombotic event, agree with decision to hold off lovenox ppx at this time\par -post partum if she has a c/section, would advise lovenox ppx x 6 weeks\par -return visit after delivery in march\par -csection lovenox \par

## 2020-11-19 NOTE — RESULTS/DATA
[FreeTextEntry1] : \par Done this year at cardiologist office, labs reviewed by me and submitted for uploading\par -B2 GP negaitve\par -ACL negative\par -DRVVT negative\par -hexagonal phase negative\par -Protein C/S/ATIII normal\par -Prothrombin gene mutation heterozygous\par -FVL negative\par \par

## 2020-11-19 NOTE — HISTORY OF PRESENT ILLNESS
[de-identified] : 29 year old female with a heterozygous prothrombin gene mutation here to see me in the midst of pregnancy to ensure no need for lovenox.\par \par She has no history of any medical issues. Reports she bruises easily and has stiffness in her joints but no increased bleeding. She reports her periods were normal. Not increased in terms of bleeding.  Was on OCPs for 10 years for acne without any dvt/pe.  Father passed away of a heart attack, significant diabetes.  Mother has apls of pregnancy. She also has had silent strokes identified on ct scan 20-30 years ago. Patient herself has no evidence of APLA. ACL, B2GP and DRVVT are all negative.

## 2020-12-22 ENCOUNTER — APPOINTMENT (OUTPATIENT)
Dept: INTERNAL MEDICINE | Facility: CLINIC | Age: 30
End: 2020-12-22
Payer: COMMERCIAL

## 2020-12-22 PROCEDURE — 99072 ADDL SUPL MATRL&STAF TM PHE: CPT

## 2020-12-22 PROCEDURE — 90715 TDAP VACCINE 7 YRS/> IM: CPT

## 2020-12-22 PROCEDURE — 90471 IMMUNIZATION ADMIN: CPT

## 2021-03-14 ENCOUNTER — OUTPATIENT (OUTPATIENT)
Dept: OUTPATIENT SERVICES | Facility: HOSPITAL | Age: 31
LOS: 1 days | Discharge: HOME | End: 2021-03-14

## 2021-03-14 DIAGNOSIS — D64.9 ANEMIA, UNSPECIFIED: ICD-10-CM

## 2021-03-18 ENCOUNTER — APPOINTMENT (OUTPATIENT)
Dept: HEMATOLOGY ONCOLOGY | Facility: CLINIC | Age: 31
End: 2021-03-18

## 2021-05-05 ENCOUNTER — TRANSCRIPTION ENCOUNTER (OUTPATIENT)
Age: 31
End: 2021-05-05

## 2021-05-27 ENCOUNTER — TRANSCRIPTION ENCOUNTER (OUTPATIENT)
Age: 31
End: 2021-05-27

## 2021-05-31 ENCOUNTER — TRANSCRIPTION ENCOUNTER (OUTPATIENT)
Age: 31
End: 2021-05-31

## 2021-06-16 ENCOUNTER — TRANSCRIPTION ENCOUNTER (OUTPATIENT)
Age: 31
End: 2021-06-16

## 2021-07-30 ENCOUNTER — NON-APPOINTMENT (OUTPATIENT)
Age: 31
End: 2021-07-30

## 2021-08-11 ENCOUNTER — TRANSCRIPTION ENCOUNTER (OUTPATIENT)
Age: 31
End: 2021-08-11

## 2021-12-05 ENCOUNTER — TRANSCRIPTION ENCOUNTER (OUTPATIENT)
Age: 31
End: 2021-12-05

## 2022-02-04 ENCOUNTER — TRANSCRIPTION ENCOUNTER (OUTPATIENT)
Age: 32
End: 2022-02-04

## 2022-02-05 ENCOUNTER — TRANSCRIPTION ENCOUNTER (OUTPATIENT)
Age: 32
End: 2022-02-05

## 2022-03-20 ENCOUNTER — TRANSCRIPTION ENCOUNTER (OUTPATIENT)
Age: 32
End: 2022-03-20

## 2022-09-17 ENCOUNTER — NON-APPOINTMENT (OUTPATIENT)
Age: 32
End: 2022-09-17

## 2022-09-26 ENCOUNTER — NON-APPOINTMENT (OUTPATIENT)
Age: 32
End: 2022-09-26

## 2023-01-28 ENCOUNTER — NON-APPOINTMENT (OUTPATIENT)
Age: 33
End: 2023-01-28

## 2023-04-03 ENCOUNTER — APPOINTMENT (OUTPATIENT)
Dept: OTOLARYNGOLOGY | Facility: CLINIC | Age: 33
End: 2023-04-03
Payer: COMMERCIAL

## 2023-04-03 ENCOUNTER — NON-APPOINTMENT (OUTPATIENT)
Age: 33
End: 2023-04-03

## 2023-04-03 VITALS
HEIGHT: 68 IN | DIASTOLIC BLOOD PRESSURE: 84 MMHG | BODY MASS INDEX: 33.34 KG/M2 | TEMPERATURE: 97.6 F | SYSTOLIC BLOOD PRESSURE: 139 MMHG | WEIGHT: 220 LBS | HEART RATE: 84 BPM

## 2023-04-03 DIAGNOSIS — J34.89 OTHER SPECIFIED DISORDERS OF NOSE AND NASAL SINUSES: ICD-10-CM

## 2023-04-03 PROCEDURE — 99204 OFFICE O/P NEW MOD 45 MIN: CPT | Mod: 25

## 2023-04-03 PROCEDURE — 31231 NASAL ENDOSCOPY DX: CPT

## 2023-04-03 NOTE — ASSESSMENT
[FreeTextEntry1] : Patient 2-year-old mother of a 2-year-old had COVID back in January associated also with.  Prior to that severe migraines had had an MRI done which was negative after COVID she has had headaches or pressure that have persisted for quite some time for about a month and she had a couple of episodes of rhinorrhea which made her concerned both of them have seemed to resolved she is here for evaluation she also is questioning whether she may have some mild is sleep apnea on examination ears are normal nasal endoscopy shows very narrow nasal cavity with a deviated septum no tumors masses or polyps she is overweight encouraged her to lose some weight put her on Flonase nasal spray so no evidence of any infections or polyps we will reevaluate her in a month to if the episodes of rhinorrhea were to recur consideration for further work-up and possible collection to rule out CSF leak spontaneous CSF leak will be considered but it is highly unlikely all of her questions were answered and she will follow-up with us in 1 to 2 months.
Greater than or equal to 4 cm

## 2023-04-03 NOTE — REVIEW OF SYSTEMS
[As Noted in HPI] : as noted in HPI [Nasal Congestion] : nasal congestion [Problem Snoring] : problem snoring [Snoring With Pauses] : snoring with pauses [Negative] : Heme/Lymph

## 2023-04-03 NOTE — CONSULT LETTER
[Dear  ___] : Dear  [unfilled], [Consult Letter:] : I had the pleasure of evaluating your patient, [unfilled]. [Please see my note below.] : Please see my note below. [Consult Closing:] : Thank you very much for allowing me to participate in the care of this patient.  If you have any questions, please do not hesitate to contact me. [Sincerely,] : Sincerely, [FreeTextEntry3] : Luis Daniel Sandoval MD\par [facility]\par Otolaryngology and Facial Plastics\par Associated Professor, [employer]\par

## 2023-04-22 ENCOUNTER — NON-APPOINTMENT (OUTPATIENT)
Age: 33
End: 2023-04-22

## 2023-04-27 ENCOUNTER — APPOINTMENT (OUTPATIENT)
Dept: OTOLARYNGOLOGY | Facility: CLINIC | Age: 33
End: 2023-04-27

## 2023-04-27 NOTE — ED ADULT NURSE NOTE - RESPIRATORY WDL
Breathing spontaneous and unlabored. Breath sounds clear and equal bilaterally with regular rhythm. No

## 2023-04-28 ENCOUNTER — NON-APPOINTMENT (OUTPATIENT)
Age: 33
End: 2023-04-28

## 2023-06-05 ENCOUNTER — APPOINTMENT (OUTPATIENT)
Dept: OTOLARYNGOLOGY | Facility: CLINIC | Age: 33
End: 2023-06-05
Payer: COMMERCIAL

## 2023-06-05 VITALS
SYSTOLIC BLOOD PRESSURE: 119 MMHG | DIASTOLIC BLOOD PRESSURE: 84 MMHG | WEIGHT: 220 LBS | BODY MASS INDEX: 33.34 KG/M2 | HEART RATE: 77 BPM | HEIGHT: 68 IN | TEMPERATURE: 98 F

## 2023-06-05 DIAGNOSIS — J34.2 DEVIATED NASAL SEPTUM: ICD-10-CM

## 2023-06-05 DIAGNOSIS — H61.23 IMPACTED CERUMEN, BILATERAL: ICD-10-CM

## 2023-06-05 DIAGNOSIS — R09.81 NASAL CONGESTION: ICD-10-CM

## 2023-06-05 DIAGNOSIS — J35.8 OTHER CHRONIC DISEASES OF TONSILS AND ADENOIDS: ICD-10-CM

## 2023-06-05 DIAGNOSIS — Z87.09 PERSONAL HISTORY OF OTHER DISEASES OF THE RESPIRATORY SYSTEM: ICD-10-CM

## 2023-06-05 PROCEDURE — 99212 OFFICE O/P EST SF 10 MIN: CPT | Mod: 25

## 2023-06-05 PROCEDURE — 31231 NASAL ENDOSCOPY DX: CPT

## 2023-06-05 NOTE — ASSESSMENT
[FreeTextEntry1] : Patient 32-year-old follows up doing really well symptoms have dramatically improved is getting tonsil stones reassured her that nothing to do with that she has some cerumen impaction which was curetted out she is heading to her trip in [facility] will follow-up with us as needed.

## 2023-06-05 NOTE — END OF VISIT
[FreeTextEntry3] : I, Dr. Sandoval personally performed the evaluation and management (E/M) services , including all procedures, for this established patient who presents today with (a) new problem(s)/exacerbation of (an) existing condition(s). That E/M includes conducting the clinically appropriate interval history &/or exam, assessing all new/exacerbated conditions, and establishing a new plan of care. Today, my LIUDMILA, Loren Harris, was here to observe &/or participate in the visit & follow plan of care established by me.\par \par \par

## 2023-06-05 NOTE — HISTORY OF PRESENT ILLNESS
[de-identified] : PAtient last seen about 2 months ago for snoring and nasal congestion with runny nose. SHe admits that after using the flonase at night for the last few months. SHe does admit that her snoring is better. SHe has no longer has any clear runny nose or discharge SHe occasionally has sinus pressure but nothing recent\par SHe also comes in with tonsil stones recently that are bothering her. Recently she has been taking them out with Qtips and then one day she saw some white mucus come out when she pushed on it. She denies throat pain \par

## 2023-06-05 NOTE — REVIEW OF SYSTEMS
[Nasal Congestion] : nasal congestion [Problem Snoring] : problem snoring [As Noted in HPI] : as noted in HPI [Negative] : Heme/Lymph [de-identified] : tonsil stones

## 2023-06-05 NOTE — PHYSICAL EXAM
[Nasal Endoscopy Performed] : nasal endoscopy was performed, see procedure section for findings [] : septum deviated to the left [Midline] : trachea located in midline position [Normal] : no rashes [de-identified] : cryptic tonsils

## 2023-07-10 ENCOUNTER — NON-APPOINTMENT (OUTPATIENT)
Age: 33
End: 2023-07-10

## 2023-09-28 ENCOUNTER — APPOINTMENT (OUTPATIENT)
Dept: OTOLARYNGOLOGY | Facility: CLINIC | Age: 33
End: 2023-09-28

## 2023-11-07 ENCOUNTER — NON-APPOINTMENT (OUTPATIENT)
Age: 33
End: 2023-11-07

## 2023-12-02 NOTE — ED ADULT NURSE NOTE - NEURO WDL
Alert and oriented to person, place and time, memory intact, behavior appropriate to situation, PERRL. (E4) spontaneous

## 2024-01-30 ENCOUNTER — APPOINTMENT (OUTPATIENT)
Dept: INTERNAL MEDICINE | Facility: CLINIC | Age: 34
End: 2024-01-30
Payer: COMMERCIAL

## 2024-01-30 VITALS
WEIGHT: 228 LBS | OXYGEN SATURATION: 98 % | HEART RATE: 75 BPM | BODY MASS INDEX: 34.56 KG/M2 | HEIGHT: 68 IN | DIASTOLIC BLOOD PRESSURE: 80 MMHG | SYSTOLIC BLOOD PRESSURE: 113 MMHG

## 2024-01-30 DIAGNOSIS — R53.83 OTHER FATIGUE: ICD-10-CM

## 2024-01-30 DIAGNOSIS — E55.9 VITAMIN D DEFICIENCY, UNSPECIFIED: ICD-10-CM

## 2024-01-30 DIAGNOSIS — H04.129 DRY EYE SYNDROME OF UNSPECIFIED LACRIMAL GLAND: ICD-10-CM

## 2024-01-30 DIAGNOSIS — R73.9 HYPERGLYCEMIA, UNSPECIFIED: ICD-10-CM

## 2024-01-30 DIAGNOSIS — Z00.00 ENCOUNTER FOR GENERAL ADULT MEDICAL EXAMINATION W/OUT ABNORMAL FINDINGS: ICD-10-CM

## 2024-01-30 DIAGNOSIS — E53.8 DEFICIENCY OF OTHER SPECIFIED B GROUP VITAMINS: ICD-10-CM

## 2024-01-30 DIAGNOSIS — Z13.220 ENCOUNTER FOR SCREENING FOR LIPOID DISORDERS: ICD-10-CM

## 2024-01-30 DIAGNOSIS — R06.83 SNORING: ICD-10-CM

## 2024-01-30 DIAGNOSIS — D68.52 PROTHROMBIN GENE MUTATION: ICD-10-CM

## 2024-01-30 LAB
BASOPHILS # BLD AUTO: 0.04 K/UL
BASOPHILS NFR BLD AUTO: 0.4 %
EOSINOPHIL # BLD AUTO: 0.31 K/UL
EOSINOPHIL NFR BLD AUTO: 3 %
HCT VFR BLD CALC: 41.5 %
HGB BLD-MCNC: 13.3 G/DL
IMM GRANULOCYTES NFR BLD AUTO: 0.2 %
LYMPHOCYTES # BLD AUTO: 2.49 K/UL
LYMPHOCYTES NFR BLD AUTO: 24.2 %
MAN DIFF?: NORMAL
MCHC RBC-ENTMCNC: 27.9 PG
MCHC RBC-ENTMCNC: 32 GM/DL
MCV RBC AUTO: 87.2 FL
MONOCYTES # BLD AUTO: 0.41 K/UL
MONOCYTES NFR BLD AUTO: 4 %
NEUTROPHILS # BLD AUTO: 7 K/UL
NEUTROPHILS NFR BLD AUTO: 68.2 %
PLATELET # BLD AUTO: 356 K/UL
RBC # BLD: 4.76 M/UL
RBC # FLD: 12.7 %
WBC # FLD AUTO: 10.27 K/UL

## 2024-01-30 PROCEDURE — 36415 COLL VENOUS BLD VENIPUNCTURE: CPT

## 2024-01-30 PROCEDURE — 99385 PREV VISIT NEW AGE 18-39: CPT | Mod: 25

## 2024-01-30 NOTE — HEALTH RISK ASSESSMENT
[Fair] :  ~his/her~ mood as fair [Yes] : Yes [Monthly or less (1 pt)] : Monthly or less (1 point) [1 or 2 (0 pts)] : 1 or 2 (0 points) [Never (0 pts)] : Never (0 points) [No] : In the past 12 months have you used drugs other than those required for medical reasons? No [No falls in past year] : Patient reported no falls in the past year [Little interest or pleasure doing things] : 1) Little interest or pleasure doing things [Feeling down, depressed, or hopeless] : 2) Feeling down, depressed, or hopeless [0] : 2) Feeling down, depressed, or hopeless: Not at all (0) [PHQ-2 Negative - No further assessment needed] : PHQ-2 Negative - No further assessment needed [de-identified] : ophtalmologist, gyn, rheumatologist [Audit-CScore] : 1 [de-identified] : no [de-identified] : regular  [[id number]] : 0 [Patient reported PAP Smear was normal] : Patient reported PAP Smear was normal [HIV test declined] : HIV test declined [Hepatitis C test declined] : Hepatitis C test declined [Change in mental status noted] : No change in mental status noted [Language] : denies difficulty with language [Behavior] : denies difficulty with behavior [Learning/Retaining New Information] : denies difficulty learning/retaining new information [Handling Complex Tasks] : denies difficulty handling complex tasks [None] : None [With Family] : lives with family [# of Members in Household ___] :  household currently consist of [unfilled] member(s) [Employed] : employed [Graduate School] : graduate school [] :  [# Of Children ___] : has [unfilled] children [Sexually Active] : sexually active [Feels Safe at Home] : Feels safe at home [Fully functional (bathing, dressing, toileting, transferring, walking, feeding)] : Fully functional (bathing, dressing, toileting, transferring, walking, feeding) [Fully functional (using the telephone, shopping, preparing meals, housekeeping, doing laundry, using] : Fully functional and needs no help or supervision to perform IADLs (using the telephone, shopping, preparing meals, housekeeping, doing laundry, using transportation, managing medications and managing finances) [Reports changes in hearing] : Reports no changes in hearing [Reports changes in vision] : Reports changes in vision [Reports changes in dental health] : Reports no changes in dental health [Smoke Detector] : smoke detector [Carbon Monoxide Detector] : carbon monoxide detector [Guns at Home] : no guns at home [Safety elements used in home] : safety elements used in home [Seat Belt] :  uses seat belt [Sunscreen] : uses sunscreen [MammogramDate] : 02/2023 [MammogramComments] : BIRCARIDAD's 4, had a L breast biopsy(benign), did b/l breast u/s on 08/2023 - BIRAD's 2 [PapSmearDate] : 01/23/24 [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

## 2024-01-30 NOTE — REVIEW OF SYSTEMS
[TextEntry] : Constitutional: Denies fever, c/o  fatigue,  has  recent changes in the weight(gain weight) Head: Denies dizziness. c/o headache(did not sleep well at night) Eyes: Denies diplopia, tearing or pain Ears: Denies earache, hearing loss, c/o R sided tinnitus Nose: Denies nasal obstruction,  epistaxis Throat: Denies throat pain Neck: Denies stiffness, muscle tenderness Chest: Denies cough, SOB, wheezing, chest congestion CV: Denies chest pain, palpitation GI: Denies abdominal pain, constipation, heartburn Genitourinary: Denies dysuria, urinary urgency Musculoskeletal: Denies joint pain Neuro: Denies changes in mental status Psychiatric: Denies depressive symptoms, change in sleep habits, changes in thought content

## 2024-01-30 NOTE — PLAN
[FreeTextEntry1] : Ms. TIMMY CHENG 33 year  female  with a PMH dry eyes syndrome, prothrombin gene mutation(heterogenous)   present to the office for a physical exam. Well adult exam is performed. Recommend  to do a blood test today, further management will depend on the blood test results. Fatigue, gain weight will do a blood test to check tsh, cbc Snoring needs to do a sleep study to r/o KARLA For a dry eyes continue to use eye drops(artificial tears) , f/u with ophtalmologist Encourage patient to do a self breast exam monthly Bring report of the mammo, breast u/s report, pap test  RTC to f/u in 2 wks. Patient is verbalized understanding

## 2024-01-30 NOTE — PHYSICAL EXAM
[TextEntry] : Constitutional: Well developed, well appearing, not in acute distress Head: Normocephalic, no lesions Eyes: PERRLA, conjunctiva is NL, clear Ear: Ear canal is normal, tympanic membrane is intact Nose: Nasal turbinates are NL Throat: Clear, no exudates, no lesions Neck: Supple, no masses Chest: Lungs are clear, no rales, no rhonchi, no wheezing Heart: Regular rate, no murmurs, no rubs, no gallops Breast: b/l breast is symmetric, areola and nipple is NL, breast is multinodular, dense. Axillary LN is NL b/l Abdomen: Soft, no tenderness, no masses, bowel sounds are normal : No CVAT Extremities: FROM, no deformities, no edema Musculoskeletal: has no tenderness of the spine, ROM is NL Skin: Has an acne scar on the face, has an erythematous rash on the upper back(due to a perspiration) Neuro: AAO x 3, no focal neurological deficit. Psychiatric: oriented to person, place, and time and insight and judgment were intact.

## 2024-01-30 NOTE — HISTORY OF PRESENT ILLNESS
[FreeTextEntry1] : New patient [de-identified] : Ms. TIMMY CHENG 33 year  female  with a PMH dry eyes syndrome, prothrombin gene mutation(heterogenous)   present to the office for a physical exam and to establish care with a new PCP.  Patient is c/o fatigue for the past 8 mo, gain weight.

## 2024-02-01 LAB
25(OH)D3 SERPL-MCNC: 30.1 NG/ML
ALBUMIN SERPL ELPH-MCNC: 4.5 G/DL
ALP BLD-CCNC: 79 U/L
ALT SERPL-CCNC: 17 U/L
ANION GAP SERPL CALC-SCNC: 16 MMOL/L
AST SERPL-CCNC: 18 U/L
BILIRUB SERPL-MCNC: 0.3 MG/DL
BUN SERPL-MCNC: 12 MG/DL
CALCIUM SERPL-MCNC: 9.3 MG/DL
CHLORIDE SERPL-SCNC: 104 MMOL/L
CHOLEST SERPL-MCNC: 176 MG/DL
CO2 SERPL-SCNC: 21 MMOL/L
CREAT SERPL-MCNC: 0.63 MG/DL
EGFR: 119 ML/MIN/1.73M2
ESTIMATED AVERAGE GLUCOSE: 108 MG/DL
GLUCOSE SERPL-MCNC: 87 MG/DL
HBA1C MFR BLD HPLC: 5.4 %
HDLC SERPL-MCNC: 56 MG/DL
IRON SATN MFR SERPL: 15 %
IRON SERPL-MCNC: 47 UG/DL
LDLC SERPL CALC-MCNC: 106 MG/DL
NONHDLC SERPL-MCNC: 120 MG/DL
POTASSIUM SERPL-SCNC: 4.8 MMOL/L
PROT SERPL-MCNC: 7.3 G/DL
SODIUM SERPL-SCNC: 141 MMOL/L
TIBC SERPL-MCNC: 310 UG/DL
TRIGL SERPL-MCNC: 73 MG/DL
TSH SERPL-ACNC: 2.1 UIU/ML
UIBC SERPL-MCNC: 263 UG/DL
VIT B12 SERPL-MCNC: 580 PG/ML

## 2024-03-20 ENCOUNTER — TRANSCRIPTION ENCOUNTER (OUTPATIENT)
Age: 34
End: 2024-03-20

## 2024-04-02 ENCOUNTER — NON-APPOINTMENT (OUTPATIENT)
Age: 34
End: 2024-04-02

## 2024-05-02 ENCOUNTER — APPOINTMENT (OUTPATIENT)
Dept: INTERNAL MEDICINE | Facility: CLINIC | Age: 34
End: 2024-05-02

## 2024-05-02 DIAGNOSIS — D89.89 OTHER SPECIFIED DISORDERS INVOLVING THE IMMUNE MECHANISM, NOT ELSEWHERE CLASSIFIED: ICD-10-CM

## 2024-06-20 ENCOUNTER — APPOINTMENT (OUTPATIENT)
Dept: INTERNAL MEDICINE | Facility: CLINIC | Age: 34
End: 2024-06-20
Payer: COMMERCIAL

## 2024-06-20 VITALS
OXYGEN SATURATION: 98 % | WEIGHT: 226 LBS | BODY MASS INDEX: 34.25 KG/M2 | SYSTOLIC BLOOD PRESSURE: 110 MMHG | HEART RATE: 73 BPM | HEIGHT: 68 IN | DIASTOLIC BLOOD PRESSURE: 90 MMHG

## 2024-06-20 DIAGNOSIS — Z01.84 ENCOUNTER FOR ANTIBODY RESPONSE EXAMINATION: ICD-10-CM

## 2024-06-20 DIAGNOSIS — R79.82 ELEVATED C-REACTIVE PROTEIN (CRP): ICD-10-CM

## 2024-06-20 DIAGNOSIS — Z11.1 ENCOUNTER FOR SCREENING FOR RESPIRATORY TUBERCULOSIS: ICD-10-CM

## 2024-06-20 PROCEDURE — 99213 OFFICE O/P EST LOW 20 MIN: CPT

## 2024-06-25 LAB
CRP SERPL-MCNC: 19 MG/L
HBV SURFACE AB SERPL IA-ACNC: >1000 MIU/ML
M TB IFN-G BLD-IMP: NEGATIVE
MEV IGG FLD QL IA: 24.6 AU/ML
MEV IGG+IGM SER-IMP: POSITIVE
MUV AB SER-ACNC: NEGATIVE
MUV IGG SER QL IA: <5 AU/ML
QUANTIFERON TB PLUS MITOGEN MINUS NIL: 6.76 IU/ML
QUANTIFERON TB PLUS NIL: 0.03 IU/ML
QUANTIFERON TB PLUS TB1 MINUS NIL: 0.01 IU/ML
QUANTIFERON TB PLUS TB2 MINUS NIL: 0.01 IU/ML
RUBV IGG FLD-ACNC: 3.3 INDEX
RUBV IGG SER-IMP: POSITIVE
VZV AB TITR SER: POSITIVE
VZV IGG SER IF-ACNC: 1380 INDEX

## 2024-06-26 ENCOUNTER — NON-APPOINTMENT (OUTPATIENT)
Age: 34
End: 2024-06-26

## 2024-06-27 ENCOUNTER — APPOINTMENT (OUTPATIENT)
Dept: SURGERY | Facility: CLINIC | Age: 34
End: 2024-06-27
Payer: COMMERCIAL

## 2024-06-27 DIAGNOSIS — E04.1 NONTOXIC SINGLE THYROID NODULE: ICD-10-CM

## 2024-06-27 DIAGNOSIS — R59.0 LOCALIZED ENLARGED LYMPH NODES: ICD-10-CM

## 2024-06-27 PROCEDURE — 99204 OFFICE O/P NEW MOD 45 MIN: CPT

## 2024-06-28 DIAGNOSIS — Z23 ENCOUNTER FOR IMMUNIZATION: ICD-10-CM

## 2024-06-30 ENCOUNTER — RESULT CHARGE (OUTPATIENT)
Age: 34
End: 2024-06-30

## 2024-07-01 ENCOUNTER — APPOINTMENT (OUTPATIENT)
Dept: INTERNAL MEDICINE | Facility: CLINIC | Age: 34
End: 2024-07-01

## 2024-07-20 ENCOUNTER — OUTPATIENT (OUTPATIENT)
Dept: OUTPATIENT SERVICES | Facility: HOSPITAL | Age: 34
LOS: 1 days | Discharge: HOME | End: 2024-07-20

## 2024-07-20 DIAGNOSIS — D64.9 ANEMIA, UNSPECIFIED: ICD-10-CM

## 2024-07-28 ENCOUNTER — NON-APPOINTMENT (OUTPATIENT)
Age: 34
End: 2024-07-28

## 2024-07-29 ENCOUNTER — RESULT REVIEW (OUTPATIENT)
Age: 34
End: 2024-07-29

## 2024-07-29 ENCOUNTER — APPOINTMENT (OUTPATIENT)
Dept: HEMATOLOGY ONCOLOGY | Facility: CLINIC | Age: 34
End: 2024-07-29
Payer: COMMERCIAL

## 2024-07-29 VITALS
OXYGEN SATURATION: 95 % | BODY MASS INDEX: 36.49 KG/M2 | DIASTOLIC BLOOD PRESSURE: 76 MMHG | SYSTOLIC BLOOD PRESSURE: 115 MMHG | HEIGHT: 65.98 IN | HEART RATE: 76 BPM | RESPIRATION RATE: 16 BRPM | WEIGHT: 227.05 LBS | TEMPERATURE: 98 F

## 2024-07-29 DIAGNOSIS — Z15.89 GENETIC SUSCEPTIBILITY TO OTHER DISEASE: ICD-10-CM

## 2024-07-29 LAB
BASOPHILS # BLD AUTO: 0.05 K/UL — SIGNIFICANT CHANGE UP (ref 0–0.2)
BASOPHILS NFR BLD AUTO: 0.5 % — SIGNIFICANT CHANGE UP (ref 0–2)
EOSINOPHIL # BLD AUTO: 0.32 K/UL — SIGNIFICANT CHANGE UP (ref 0–0.5)
EOSINOPHIL NFR BLD AUTO: 3.5 % — SIGNIFICANT CHANGE UP (ref 0–6)
HCT VFR BLD CALC: 37.5 % — SIGNIFICANT CHANGE UP (ref 34.5–45)
HGB BLD-MCNC: 12.3 G/DL — SIGNIFICANT CHANGE UP (ref 11.5–15.5)
IMM GRANULOCYTES NFR BLD AUTO: 0.3 % — SIGNIFICANT CHANGE UP (ref 0–0.9)
LYMPHOCYTES # BLD AUTO: 3.15 K/UL — SIGNIFICANT CHANGE UP (ref 1–3.3)
LYMPHOCYTES # BLD AUTO: 34.5 % — SIGNIFICANT CHANGE UP (ref 13–44)
MCHC RBC-ENTMCNC: 27.8 PG — SIGNIFICANT CHANGE UP (ref 27–34)
MCHC RBC-ENTMCNC: 32.8 G/DL — SIGNIFICANT CHANGE UP (ref 32–36)
MCV RBC AUTO: 84.7 FL — SIGNIFICANT CHANGE UP (ref 80–100)
MONOCYTES # BLD AUTO: 0.39 K/UL — SIGNIFICANT CHANGE UP (ref 0–0.9)
MONOCYTES NFR BLD AUTO: 4.3 % — SIGNIFICANT CHANGE UP (ref 2–14)
NEUTROPHILS # BLD AUTO: 5.19 K/UL — SIGNIFICANT CHANGE UP (ref 1.8–7.4)
NEUTROPHILS NFR BLD AUTO: 56.9 % — SIGNIFICANT CHANGE UP (ref 43–77)
NRBC # BLD: 0 /100 WBCS — SIGNIFICANT CHANGE UP (ref 0–0)
PLATELET # BLD AUTO: 311 K/UL — SIGNIFICANT CHANGE UP (ref 150–400)
RBC # BLD: 4.43 M/UL — SIGNIFICANT CHANGE UP (ref 3.8–5.2)
RBC # FLD: 12.7 % — SIGNIFICANT CHANGE UP (ref 10.3–14.5)
WBC # BLD: 9.13 K/UL — SIGNIFICANT CHANGE UP (ref 3.8–10.5)
WBC # FLD AUTO: 9.13 K/UL — SIGNIFICANT CHANGE UP (ref 3.8–10.5)

## 2024-07-29 PROCEDURE — 99205 OFFICE O/P NEW HI 60 MIN: CPT

## 2024-07-30 ENCOUNTER — APPOINTMENT (OUTPATIENT)
Dept: SURGERY | Facility: CLINIC | Age: 34
End: 2024-07-30

## 2024-07-30 ENCOUNTER — APPOINTMENT (OUTPATIENT)
Dept: SURGERY | Facility: CLINIC | Age: 34
End: 2024-07-30
Payer: COMMERCIAL

## 2024-07-30 DIAGNOSIS — R59.0 LOCALIZED ENLARGED LYMPH NODES: ICD-10-CM

## 2024-07-30 PROCEDURE — 99442: CPT

## 2024-07-30 NOTE — HISTORY OF PRESENT ILLNESS
[de-identified] : telehealth visit performed with patient at home and no other parties present. Patient understands the limitations of telehealth and agrees to proceed with the visit as scheduled. physician present in [facility] office. patient previously evaluated for cervical adenopathy.  notes intermittent swelling of neck bilaterally. denies any other symptoms related. no changes medically since last visit. recent sonogram shows multiple benign appearing nodes. I have reviewed all old and new data and available images. [Abdominal Pain] : abdominal pain [Dysuria] : dysuria [Polyuria] : polyuria [Vaginal Dischage] : vaginal discharge [Vaginal Itch] : vaginal itch [Negative] : Skin [PO Intolerance] : PO tolerance [Vomiting] : no vomiting [Diarrhea] : no diarrhea [Gaseous] : not gaseous [Hematuria] : no hematuria [Vaginal Pain] : no vaginal pain

## 2024-07-30 NOTE — ASSESSMENT
[FreeTextEntry1] : sonogram results discussed with patient. will observe. to apply warm compresses as needed. no indication for any biopsies, radiographs or antibiotics. to return earlier if any change. sonogram next visit. RTO 4-6 months or earlier if any change. patient has been given the opportunity to ask questions, and all of the patient's questions have been answered to their satisfaction

## 2024-08-07 LAB
AT III PPP CHRO-ACNC: 92 %
DNA PLOIDY SPEC FC-IMP: NORMAL
HCYS SERPL-MCNC: 6 UMOL/L
PROT C PPP CHRO-ACNC: 121 %
PROT S FREE PPP-ACNC: 120 %

## 2024-08-07 NOTE — HISTORY OF PRESENT ILLNESS
[de-identified] : 29 year old female PMH of JOSEF, heterozygous prothrombin gene mutation here for initial consultation.     Visit: Pt was last seen by hematology (Dr. Loren Mckeon on ) in the midst of pregnancy to ensure no need for lovenox.  Reports she bruises easily and has stiffness in her joints but no increased bleeding. She reports her periods were normal. Not increased in terms of bleeding. Was on OCPs for 10 years for acne without any dvt/pe. Father passed away of a heart attack, significant diabetes. Mother has apls of pregnancy. She also has had silent strokes identified on ct scan 20-30 years ago. Patient herself has no evidence of APLA. ACL, B2GP and DRVVT are all negative.    workup -B2 GP negative  -ACL negative  -DRVVT negative  -hexagonal phase negative  -Protein C/S/ATIII normal  -Prothrombin gene mutation heterozygous  -FVL negative   April-May 2024: Pt was experiencing fatigue, Elevated ESR (14), CRP (10s-20s). Pt was being worked up by rheumatology who did numerous tests. Pt was told she has diagnosis of undifferentiated CTD and Secondary Sjogren's  .  Pt was recommended to start on HCQ but she did not start yet. During the workup pt was found to have mutation in MTHFR gene. May 2024 lab (scanned result) CRP 17, ESR6, EBV DNA wnl, syphillis RPR, -ve DsDNA, RNP Ab,SM ab, Anti CCP, HIV-ve , TIBC wnl, serum protein electrophoresis wnl, Beta 2 glycoprotein -ve. Results scanned into patient's chart.     24 head and neck surgery consultation: Pt c/o left neck mass and now has new mass on right side and night sweats. denies dysphagia, hoarseness, SOB, RT exposure, fever, infections or skin cancer excision. Pt has elevated CRP and which is being worked up.  Sonogram: Right 5 mm and 7 mm, Left 3 mm thyroid nodules and Right level 2 lymph node 1.5 cm  US thyroid and soft tissue neck 24-> B/l cervical LAD, benign appearing.   normal TFTs, calcium 9.6   24: Pt came to establish care with hematology. Her primary question is if she needs any AC iso heterozygous prothrombin gene mutation, MTHFR mutation.   Pt denies any hx DVT, PE, Stroke. Pt had 1 uneventful pregnancy in .    Mother had embolic stroke, had 6

## 2024-08-07 NOTE — REASON FOR VISIT
[Initial Consultation] : an initial consultation for [FreeTextEntry2] : Heterozygous prothrombin gene mutation, MTHFR mutation

## 2024-08-07 NOTE — HISTORY OF PRESENT ILLNESS
[de-identified] : 29 year old female PMH of JOSEF, heterozygous prothrombin gene mutation here for initial consultation.     Visit: Pt was last seen by hematology (Dr. Loren Mckeon on ) in the midst of pregnancy to ensure no need for lovenox.  Reports she bruises easily and has stiffness in her joints but no increased bleeding. She reports her periods were normal. Not increased in terms of bleeding. Was on OCPs for 10 years for acne without any dvt/pe. Father passed away of a heart attack, significant diabetes. Mother has apls of pregnancy. She also has had silent strokes identified on ct scan 20-30 years ago. Patient herself has no evidence of APLA. ACL, B2GP and DRVVT are all negative.    workup -B2 GP negative  -ACL negative  -DRVVT negative  -hexagonal phase negative  -Protein C/S/ATIII normal  -Prothrombin gene mutation heterozygous  -FVL negative   April-May 2024: Pt was experiencing fatigue, Elevated ESR (14), CRP (10s-20s). Pt was being worked up by rheumatology who did numerous tests. Pt was told she has diagnosis of undifferentiated CTD and Secondary Sjogren's  .  Pt was recommended to start on HCQ but she did not start yet. During the workup pt was found to have mutation in MTHFR gene. May 2024 lab (scanned result) CRP 17, ESR6, EBV DNA wnl, syphillis RPR, -ve DsDNA, RNP Ab,SM ab, Anti CCP, HIV-ve , TIBC wnl, serum protein electrophoresis wnl, Beta 2 glycoprotein -ve. Results scanned into patient's chart.     24 head and neck surgery consultation: Pt c/o left neck mass and now has new mass on right side and night sweats. denies dysphagia, hoarseness, SOB, RT exposure, fever, infections or skin cancer excision. Pt has elevated CRP and which is being worked up.  Sonogram: Right 5 mm and 7 mm, Left 3 mm thyroid nodules and Right level 2 lymph node 1.5 cm  US thyroid and soft tissue neck 24-> B/l cervical LAD, benign appearing.   normal TFTs, calcium 9.6   24: Pt came to establish care with hematology. Her primary question is if she needs any AC iso heterozygous prothrombin gene mutation, MTHFR mutation.   Pt denies any hx DVT, PE, Stroke. Pt had 1 uneventful pregnancy in .    Mother had embolic stroke, had 6

## 2024-08-07 NOTE — ASSESSMENT
[FreeTextEntry1] : 30 F  came to establish care with hematology with heterogenous prothrombin gene mutation and MTHFR mutation. Pt had 1 uneventful pregnancy in  ( no clot/complication/ didn't require AC). Patient is planning for pregnancy again soon and wants to know if she needs any anticoagulation.   # Heterozygous prothrombin gene mutation # MTHFR gene mutation - pt does not have any hx of pregnancy complication/stroke/DVT/PE - low risk of having clot during pregnancy - will check Homocysteine level, Antithrombin III, Protein C & Protein S - will review the results over phone with patient   I discussed this patient in a pre-clinic session with the fellow including review of clinical status and last labs.  I also saw the patient and discussed history, completed an exam and discussed the plan together with the fellow.  Total time spent today on this patient  36    minutes.  This is a 30 year old women,  who presents for recommendations due to a history of a heterozygous PGM along with a homozygous MTHFR mutation.  Patient has no personal history of VTE, and no history of miscarriages.    Explained to patient that the heterozygous prothrombin gene mutation is a mild hypercoagulable state with a relative risk of VTE that is 2-3 fold increased over the general population. This is not sufficient to lead to enough risk to warrant DVT prophylaxis during a pregnancy with LMWH.  Similarly the MTHFR mutation is even lower risk for VTW with a RR of 1.6 fold over the general population. Even the combination of these two  is insufficient to lead to recommendations for prophylaxis.  The MTHFR is mediated by homocysteine, which could still actually be normal. Would check homocysteine level, along with the remainder of the hypercoagulable states including Factor V eldien, Protein C, S and ATIII.    These hypercoagulable states are also insufficiency to lead to the recommendation for prophylactic LMWH for the purpose of maintaining viability of the developing fetus.  Patent  does not have a history of a spontaneous miscarriage.    addendum 24  FVL normal  Protein S activity 120%  Orotein C activity 121%  ATIII activity 92%  Homocysteine 6umol/L  Patient's remaining hypercoagulable workup was unremarkable. MTHFR mutation would not cause a considerable hypercoagulable defect in setting of normal Homocysteine level.  Patient would not require LMWH to support pregnancy.  Called patient to explain these results to her.    GYN Dr. Loulou Vail

## 2024-09-30 ENCOUNTER — TRANSCRIPTION ENCOUNTER (OUTPATIENT)
Age: 34
End: 2024-09-30

## 2024-10-03 ENCOUNTER — TRANSCRIPTION ENCOUNTER (OUTPATIENT)
Age: 34
End: 2024-10-03

## 2024-10-10 ENCOUNTER — TRANSCRIPTION ENCOUNTER (OUTPATIENT)
Age: 34
End: 2024-10-10

## 2024-10-17 DIAGNOSIS — E04.1 NONTOXIC SINGLE THYROID NODULE: ICD-10-CM

## 2024-10-21 ENCOUNTER — APPOINTMENT (OUTPATIENT)
Dept: GASTROENTEROLOGY | Facility: CLINIC | Age: 34
End: 2024-10-21
Payer: COMMERCIAL

## 2024-10-21 VITALS
BODY MASS INDEX: 36 KG/M2 | TEMPERATURE: 97.3 F | SYSTOLIC BLOOD PRESSURE: 130 MMHG | HEIGHT: 65.98 IN | WEIGHT: 224 LBS | OXYGEN SATURATION: 99 % | DIASTOLIC BLOOD PRESSURE: 80 MMHG | HEART RATE: 73 BPM

## 2024-10-21 DIAGNOSIS — M53.3 SACROCOCCYGEAL DISORDERS, NOT ELSEWHERE CLASSIFIED: ICD-10-CM

## 2024-10-21 DIAGNOSIS — K58.9 IRRITABLE BOWEL SYNDROME, UNSPECIFIED: ICD-10-CM

## 2024-10-21 DIAGNOSIS — Z87.09 PERSONAL HISTORY OF OTHER DISEASES OF THE RESPIRATORY SYSTEM: ICD-10-CM

## 2024-10-21 DIAGNOSIS — M62.89 OTHER SPECIFIED DISORDERS OF MUSCLE: ICD-10-CM

## 2024-10-21 DIAGNOSIS — R59.0 LOCALIZED ENLARGED LYMPH NODES: ICD-10-CM

## 2024-10-21 DIAGNOSIS — Z87.39 PERSONAL HISTORY OF OTHER DISEASES OF THE MUSCULOSKELETAL SYSTEM AND CONNECTIVE TISSUE: ICD-10-CM

## 2024-10-21 DIAGNOSIS — Z83.3 FAMILY HISTORY OF DIABETES MELLITUS: ICD-10-CM

## 2024-10-21 DIAGNOSIS — Z83.2 FAMILY HISTORY OF DISEASES OF THE BLOOD AND BLOOD-FORMING ORGANS AND CERTAIN DISORDERS INVOLVING THE IMMUNE MECHANISM: ICD-10-CM

## 2024-10-21 DIAGNOSIS — M51.369: ICD-10-CM

## 2024-10-21 DIAGNOSIS — Z82.49 FAMILY HISTORY OF ISCHEMIC HEART DISEASE AND OTHER DISEASES OF THE CIRCULATORY SYSTEM: ICD-10-CM

## 2024-10-21 DIAGNOSIS — M51.26 OTHER INTERVERTEBRAL DISC DISPLACEMENT, LUMBAR REGION: ICD-10-CM

## 2024-10-21 DIAGNOSIS — Z83.438 FAMILY HISTORY OF OTHER DISORDER OF LIPOPROTEIN METABOLISM AND OTHER LIPIDEMIA: ICD-10-CM

## 2024-10-21 PROCEDURE — 99203 OFFICE O/P NEW LOW 30 MIN: CPT

## 2024-10-21 PROCEDURE — 82270 OCCULT BLOOD FECES: CPT

## 2024-11-16 ENCOUNTER — APPOINTMENT (OUTPATIENT)
Dept: MRI IMAGING | Facility: CLINIC | Age: 34
End: 2024-11-16

## 2024-11-16 ENCOUNTER — OUTPATIENT (OUTPATIENT)
Dept: OUTPATIENT SERVICES | Facility: HOSPITAL | Age: 34
LOS: 1 days | End: 2024-11-16
Payer: COMMERCIAL

## 2024-11-16 DIAGNOSIS — M53.3 SACROCOCCYGEAL DISORDERS, NOT ELSEWHERE CLASSIFIED: ICD-10-CM

## 2024-11-16 PROCEDURE — A9585: CPT

## 2024-11-16 PROCEDURE — 72197 MRI PELVIS W/O & W/DYE: CPT

## 2024-11-16 PROCEDURE — 72197 MRI PELVIS W/O & W/DYE: CPT | Mod: 26

## 2024-11-26 ENCOUNTER — APPOINTMENT (OUTPATIENT)
Dept: INTERNAL MEDICINE | Facility: CLINIC | Age: 34
End: 2024-11-26
Payer: COMMERCIAL

## 2024-11-26 VITALS
HEIGHT: 65.98 IN | BODY MASS INDEX: 36 KG/M2 | DIASTOLIC BLOOD PRESSURE: 80 MMHG | SYSTOLIC BLOOD PRESSURE: 122 MMHG | WEIGHT: 224 LBS | HEART RATE: 95 BPM | OXYGEN SATURATION: 97 %

## 2024-11-26 DIAGNOSIS — D72.829 ELEVATED WHITE BLOOD CELL COUNT, UNSPECIFIED: ICD-10-CM

## 2024-11-26 DIAGNOSIS — M53.3 SACROCOCCYGEAL DISORDERS, NOT ELSEWHERE CLASSIFIED: ICD-10-CM

## 2024-11-26 DIAGNOSIS — R59.1 GENERALIZED ENLARGED LYMPH NODES: ICD-10-CM

## 2024-11-26 DIAGNOSIS — J34.2 DEVIATED NASAL SEPTUM: ICD-10-CM

## 2024-11-26 PROCEDURE — 99214 OFFICE O/P EST MOD 30 MIN: CPT

## 2024-11-26 PROCEDURE — G2211 COMPLEX E/M VISIT ADD ON: CPT | Mod: NC

## 2024-11-26 RX ORDER — AMOXICILLIN AND CLAVULANATE POTASSIUM 875; 125 MG/1; MG/1
875-125 TABLET, COATED ORAL
Qty: 20 | Refills: 0 | Status: ACTIVE | COMMUNITY
Start: 2024-11-26 | End: 1900-01-01

## 2024-12-04 ENCOUNTER — APPOINTMENT (OUTPATIENT)
Dept: RHEUMATOLOGY | Facility: CLINIC | Age: 34
End: 2024-12-04
Payer: COMMERCIAL

## 2024-12-04 VITALS
WEIGHT: 224 LBS | DIASTOLIC BLOOD PRESSURE: 78 MMHG | SYSTOLIC BLOOD PRESSURE: 120 MMHG | HEIGHT: 65.98 IN | OXYGEN SATURATION: 98 % | BODY MASS INDEX: 36 KG/M2 | HEART RATE: 92 BPM

## 2024-12-04 DIAGNOSIS — H04.129 DRY EYE SYNDROME OF UNSPECIFIED LACRIMAL GLAND: ICD-10-CM

## 2024-12-04 DIAGNOSIS — Z82.61 FAMILY HISTORY OF ARTHRITIS: ICD-10-CM

## 2024-12-04 DIAGNOSIS — R53.83 OTHER FATIGUE: ICD-10-CM

## 2024-12-04 DIAGNOSIS — R79.82 ELEVATED C-REACTIVE PROTEIN (CRP): ICD-10-CM

## 2024-12-04 PROCEDURE — 99417 PROLNG OP E/M EACH 15 MIN: CPT

## 2024-12-04 PROCEDURE — 99215 OFFICE O/P EST HI 40 MIN: CPT | Mod: 25

## 2024-12-04 PROCEDURE — 99205 OFFICE O/P NEW HI 60 MIN: CPT

## 2024-12-13 ENCOUNTER — NON-APPOINTMENT (OUTPATIENT)
Age: 34
End: 2024-12-13

## 2025-01-28 ENCOUNTER — APPOINTMENT (OUTPATIENT)
Dept: SURGERY | Facility: CLINIC | Age: 35
End: 2025-01-28
Payer: COMMERCIAL

## 2025-01-28 DIAGNOSIS — R59.0 LOCALIZED ENLARGED LYMPH NODES: ICD-10-CM

## 2025-01-28 PROCEDURE — 99214 OFFICE O/P EST MOD 30 MIN: CPT

## 2025-01-31 ENCOUNTER — RESULT REVIEW (OUTPATIENT)
Age: 35
End: 2025-01-31

## 2025-02-05 ENCOUNTER — APPOINTMENT (OUTPATIENT)
Dept: GASTROENTEROLOGY | Facility: CLINIC | Age: 35
End: 2025-02-05

## 2025-02-06 ENCOUNTER — OUTPATIENT (OUTPATIENT)
Dept: OUTPATIENT SERVICES | Facility: HOSPITAL | Age: 35
LOS: 1 days | End: 2025-02-06
Payer: COMMERCIAL

## 2025-02-06 ENCOUNTER — RESULT REVIEW (OUTPATIENT)
Age: 35
End: 2025-02-06

## 2025-02-06 ENCOUNTER — APPOINTMENT (OUTPATIENT)
Dept: ULTRASOUND IMAGING | Facility: IMAGING CENTER | Age: 35
End: 2025-02-06
Payer: COMMERCIAL

## 2025-02-06 DIAGNOSIS — R59.0 LOCALIZED ENLARGED LYMPH NODES: ICD-10-CM

## 2025-02-06 PROCEDURE — 88184 FLOWCYTOMETRY/ TC 1 MARKER: CPT

## 2025-02-06 PROCEDURE — 88189 FLOWCYTOMETRY/READ 16 & >: CPT | Mod: 59

## 2025-02-06 PROCEDURE — 88185 FLOWCYTOMETRY/TC ADD-ON: CPT

## 2025-02-06 PROCEDURE — 10005 FNA BX W/US GDN 1ST LES: CPT

## 2025-02-06 PROCEDURE — 87205 SMEAR GRAM STAIN: CPT

## 2025-02-07 LAB — TM INTERPRETATION: SIGNIFICANT CHANGE UP

## 2025-02-12 DIAGNOSIS — R59.0 LOCALIZED ENLARGED LYMPH NODES: ICD-10-CM

## 2025-03-03 ENCOUNTER — TRANSCRIPTION ENCOUNTER (OUTPATIENT)
Age: 35
End: 2025-03-03

## 2025-03-04 ENCOUNTER — TRANSCRIPTION ENCOUNTER (OUTPATIENT)
Age: 35
End: 2025-03-04

## 2025-03-05 ENCOUNTER — APPOINTMENT (OUTPATIENT)
Dept: OBGYN | Facility: CLINIC | Age: 35
End: 2025-03-05

## 2025-03-05 VITALS
SYSTOLIC BLOOD PRESSURE: 126 MMHG | HEIGHT: 66 IN | HEART RATE: 93 BPM | BODY MASS INDEX: 36.16 KG/M2 | WEIGHT: 225 LBS | DIASTOLIC BLOOD PRESSURE: 85 MMHG

## 2025-03-05 DIAGNOSIS — E66.812 OBESITY, CLASS 2: ICD-10-CM

## 2025-03-05 DIAGNOSIS — Z31.69 ENCOUNTER FOR OTHER GENERAL COUNSELING AND ADVICE ON PROCREATION: ICD-10-CM

## 2025-03-05 PROCEDURE — 99203 OFFICE O/P NEW LOW 30 MIN: CPT

## 2025-03-06 ENCOUNTER — NON-APPOINTMENT (OUTPATIENT)
Age: 35
End: 2025-03-06

## 2025-03-06 LAB
25(OH)D3 SERPL-MCNC: 26.5 NG/ML
ABORH: NORMAL
ALBUMIN SERPL ELPH-MCNC: 4.2 G/DL
ALP BLD-CCNC: 72 U/L
ALT SERPL-CCNC: 14 U/L
ANION GAP SERPL CALC-SCNC: 17 MMOL/L
ANTIBODY SCREEN: NORMAL
AST SERPL-CCNC: 15 U/L
BASOPHILS # BLD AUTO: 0.04 K/UL
BASOPHILS NFR BLD AUTO: 0.3 %
BILIRUB SERPL-MCNC: 0.3 MG/DL
BUN SERPL-MCNC: 10 MG/DL
CALCIUM SERPL-MCNC: 9.4 MG/DL
CHLORIDE SERPL-SCNC: 99 MMOL/L
CHOLEST SERPL-MCNC: 184 MG/DL
CO2 SERPL-SCNC: 21 MMOL/L
CREAT SERPL-MCNC: 0.66 MG/DL
EGFRCR SERPLBLD CKD-EPI 2021: 117 ML/MIN/1.73M2
EOSINOPHIL # BLD AUTO: 0.22 K/UL
EOSINOPHIL NFR BLD AUTO: 1.8 %
ESTIMATED AVERAGE GLUCOSE: 108 MG/DL
HBA1C MFR BLD HPLC: 5.4 %
HCT VFR BLD CALC: 40.6 %
HDLC SERPL-MCNC: 50 MG/DL
HGB A MFR BLD: 97.5 %
HGB A2 MFR BLD: 2.5 %
HGB BLD-MCNC: 12.9 G/DL
HGB FRACT BLD-IMP: NORMAL
IMM GRANULOCYTES NFR BLD AUTO: 0.4 %
LDLC SERPL CALC-MCNC: 109 MG/DL
LYMPHOCYTES # BLD AUTO: 3.34 K/UL
LYMPHOCYTES NFR BLD AUTO: 27.3 %
MAN DIFF?: NORMAL
MCHC RBC-ENTMCNC: 27.5 PG
MCHC RBC-ENTMCNC: 31.8 G/DL
MCV RBC AUTO: 86.6 FL
MONOCYTES # BLD AUTO: 0.43 K/UL
MONOCYTES NFR BLD AUTO: 3.5 %
NEUTROPHILS # BLD AUTO: 8.16 K/UL
NEUTROPHILS NFR BLD AUTO: 66.7 %
NONHDLC SERPL-MCNC: 134 MG/DL
PLATELET # BLD AUTO: 340 K/UL
POTASSIUM SERPL-SCNC: 3.7 MMOL/L
PROT SERPL-MCNC: 7.1 G/DL
RBC # BLD: 4.69 M/UL
RBC # FLD: 13.1 %
RUBV IGG FLD-ACNC: 3.41 INDEX
RUBV IGG SER-IMP: POSITIVE
SODIUM SERPL-SCNC: 137 MMOL/L
T PALLIDUM AB SER QL IA: NEGATIVE
TRIGL SERPL-MCNC: 136 MG/DL
TSH SERPL-ACNC: 1.84 UIU/ML
VZV AB TITR SER: POSITIVE
VZV IGG SER IF-ACNC: 10.8 S/CO
WBC # FLD AUTO: 12.24 K/UL

## 2025-03-07 ENCOUNTER — TRANSCRIPTION ENCOUNTER (OUTPATIENT)
Age: 35
End: 2025-03-07

## 2025-03-07 LAB
B19V IGG SER QL IA: 0.07 INDEX
B19V IGG+IGM SER-IMP: NEGATIVE
B19V IGG+IGM SER-IMP: NORMAL
B19V IGM FLD-ACNC: 0.07 INDEX
B19V IGM SER-ACNC: NEGATIVE
ENA SS-A AB SER IA-ACNC: <0.2 AL
ENA SS-B AB SER IA-ACNC: <0.2 AL
HBV CORE IGG+IGM SER QL: NONREACTIVE
HBV SURFACE AB SER QL: REACTIVE
HBV SURFACE AG SER QL: NONREACTIVE
HCV AB SER QL: NONREACTIVE
HCV S/CO RATIO: 0.06 S/CO
HIV1+2 AB SPEC QL IA.RAPID: NONREACTIVE
LEAD BLD-MCNC: <1 UG/DL

## 2025-03-24 ENCOUNTER — APPOINTMENT (OUTPATIENT)
Dept: INTERNAL MEDICINE | Facility: CLINIC | Age: 35
End: 2025-03-24
Payer: COMMERCIAL

## 2025-03-24 VITALS
DIASTOLIC BLOOD PRESSURE: 83 MMHG | HEART RATE: 68 BPM | HEIGHT: 66 IN | SYSTOLIC BLOOD PRESSURE: 123 MMHG | BODY MASS INDEX: 35.84 KG/M2 | OXYGEN SATURATION: 96 % | WEIGHT: 223 LBS

## 2025-03-24 DIAGNOSIS — E55.9 VITAMIN D DEFICIENCY, UNSPECIFIED: ICD-10-CM

## 2025-03-24 DIAGNOSIS — R79.82 ELEVATED C-REACTIVE PROTEIN (CRP): ICD-10-CM

## 2025-03-24 DIAGNOSIS — E04.1 NONTOXIC SINGLE THYROID NODULE: ICD-10-CM

## 2025-03-24 DIAGNOSIS — R59.0 LOCALIZED ENLARGED LYMPH NODES: ICD-10-CM

## 2025-03-24 DIAGNOSIS — D72.829 ELEVATED WHITE BLOOD CELL COUNT, UNSPECIFIED: ICD-10-CM

## 2025-03-24 DIAGNOSIS — R53.83 OTHER FATIGUE: ICD-10-CM

## 2025-03-24 PROCEDURE — 99214 OFFICE O/P EST MOD 30 MIN: CPT

## 2025-03-24 PROCEDURE — 36415 COLL VENOUS BLD VENIPUNCTURE: CPT

## 2025-03-24 PROCEDURE — G2211 COMPLEX E/M VISIT ADD ON: CPT | Mod: NC

## 2025-03-27 LAB
CRP SERPL-MCNC: 12 MG/L
IRON SATN MFR SERPL: 12 %
IRON SERPL-MCNC: 39 UG/DL
T3 SERPL-MCNC: 146 NG/DL
T3FREE SERPL-MCNC: 3.47 PG/ML
T4 FREE SERPL-MCNC: 1.1 NG/DL
TIBC SERPL-MCNC: 328 UG/DL
UIBC SERPL-MCNC: 289 UG/DL

## 2025-03-31 ENCOUNTER — TRANSCRIPTION ENCOUNTER (OUTPATIENT)
Age: 35
End: 2025-03-31

## 2025-04-07 ENCOUNTER — NON-APPOINTMENT (OUTPATIENT)
Age: 35
End: 2025-04-07

## 2025-04-21 ENCOUNTER — TRANSCRIPTION ENCOUNTER (OUTPATIENT)
Age: 35
End: 2025-04-21

## 2025-04-22 ENCOUNTER — TRANSCRIPTION ENCOUNTER (OUTPATIENT)
Age: 35
End: 2025-04-22

## 2025-05-27 ENCOUNTER — NON-APPOINTMENT (OUTPATIENT)
Age: 35
End: 2025-05-27

## 2025-05-28 ENCOUNTER — APPOINTMENT (OUTPATIENT)
Dept: PULMONOLOGY | Facility: CLINIC | Age: 35
End: 2025-05-28

## 2025-05-29 ENCOUNTER — NON-APPOINTMENT (OUTPATIENT)
Age: 35
End: 2025-05-29

## 2025-06-02 ENCOUNTER — TRANSCRIPTION ENCOUNTER (OUTPATIENT)
Age: 35
End: 2025-06-02

## 2025-06-12 ENCOUNTER — APPOINTMENT (OUTPATIENT)
Dept: PULMONOLOGY | Facility: CLINIC | Age: 35
End: 2025-06-12
Payer: COMMERCIAL

## 2025-06-12 VITALS
DIASTOLIC BLOOD PRESSURE: 80 MMHG | HEIGHT: 66 IN | BODY MASS INDEX: 35.36 KG/M2 | RESPIRATION RATE: 16 BRPM | SYSTOLIC BLOOD PRESSURE: 122 MMHG | TEMPERATURE: 97.5 F | HEART RATE: 81 BPM | OXYGEN SATURATION: 98 % | WEIGHT: 220 LBS

## 2025-06-12 PROBLEM — J30.89 ENVIRONMENTAL AND SEASONAL ALLERGIES: Status: ACTIVE | Noted: 2025-06-12

## 2025-06-12 PROBLEM — Z82.3 FAMILY HISTORY OF CEREBROVASCULAR ACCIDENT (CVA): Status: ACTIVE | Noted: 2025-06-12

## 2025-06-12 PROBLEM — R93.89 ABNORMAL CXR (CHEST X-RAY): Status: ACTIVE | Noted: 2025-06-12

## 2025-06-12 PROBLEM — R06.02 SOB (SHORTNESS OF BREATH): Status: ACTIVE | Noted: 2025-06-12

## 2025-06-12 PROBLEM — U07.1 COVID-19: Status: RESOLVED | Noted: 2025-06-12 | Resolved: 2025-06-12

## 2025-06-12 PROBLEM — G47.33 OSA (OBSTRUCTIVE SLEEP APNEA): Status: ACTIVE | Noted: 2025-06-12

## 2025-06-12 PROBLEM — Z82.49 FAMILY HISTORY OF CARDIAC DISORDER: Status: ACTIVE | Noted: 2025-06-12

## 2025-06-12 PROBLEM — Z82.0 FAMILY HISTORY OF SLEEP APNEA: Status: ACTIVE | Noted: 2025-06-12

## 2025-06-12 PROBLEM — Z86.59 HISTORY OF ANXIETY: Status: RESOLVED | Noted: 2025-06-12 | Resolved: 2025-06-12

## 2025-06-12 PROCEDURE — 94799 UNLISTED PULMONARY SVC/PX: CPT

## 2025-06-12 PROCEDURE — 99204 OFFICE O/P NEW MOD 45 MIN: CPT | Mod: 25

## 2025-06-12 PROCEDURE — 94727 GAS DIL/WSHOT DETER LNG VOL: CPT

## 2025-06-12 PROCEDURE — ZZZZZ: CPT

## 2025-06-12 PROCEDURE — 71046 X-RAY EXAM CHEST 2 VIEWS: CPT

## 2025-06-12 PROCEDURE — 94729 DIFFUSING CAPACITY: CPT

## 2025-06-12 PROCEDURE — 94060 EVALUATION OF WHEEZING: CPT

## 2025-06-12 PROCEDURE — 94618 PULMONARY STRESS TESTING: CPT

## 2025-06-12 RX ORDER — OLOPATADINE HYDROCHLORIDE AND MOMETASONE FUROATE 25; 665 UG/1; UG/1
665-25 SPRAY, METERED NASAL
Qty: 1 | Refills: 3 | Status: ACTIVE | COMMUNITY
Start: 2025-06-12 | End: 1900-01-01

## 2025-06-22 LAB
25(OH)D3 SERPL-MCNC: 29.4 NG/ML
A1AT SERPL-MCNC: 151 MG/DL
BASOPHILS # BLD AUTO: 0.03 K/UL
BASOPHILS NFR BLD AUTO: 0.3 %
EOSINOPHIL # BLD AUTO: 0.27 K/UL
EOSINOPHIL NFR BLD AUTO: 2.9 %
HCT VFR BLD CALC: 41.1 %
HGB BLD-MCNC: 13.3 G/DL
IMM GRANULOCYTES NFR BLD AUTO: 0.3 %
LYMPHOCYTES # BLD AUTO: 2.91 K/UL
LYMPHOCYTES NFR BLD AUTO: 31.5 %
MAN DIFF?: NORMAL
MCHC RBC-ENTMCNC: 27.8 PG
MCHC RBC-ENTMCNC: 32.4 G/DL
MCV RBC AUTO: 86 FL
MONOCYTES # BLD AUTO: 0.44 K/UL
MONOCYTES NFR BLD AUTO: 4.8 %
NEUTROPHILS # BLD AUTO: 5.55 K/UL
NEUTROPHILS NFR BLD AUTO: 60.2 %
PLATELET # BLD AUTO: 329 K/UL
RBC # BLD: 4.78 M/UL
RBC # FLD: 12.8 %
WBC # FLD AUTO: 9.23 K/UL

## 2025-06-23 ENCOUNTER — TRANSCRIPTION ENCOUNTER (OUTPATIENT)
Age: 35
End: 2025-06-23

## 2025-06-24 ENCOUNTER — TRANSCRIPTION ENCOUNTER (OUTPATIENT)
Age: 35
End: 2025-06-24

## 2025-06-24 RX ORDER — OLOPATADINE HYDROCHLORIDE 2 MG/ML
0.2 SOLUTION/ DROPS OPHTHALMIC
Qty: 1 | Refills: 4 | Status: ACTIVE | COMMUNITY
Start: 2025-06-24 | End: 1900-01-01

## 2025-08-18 ENCOUNTER — APPOINTMENT (OUTPATIENT)
Dept: PULMONOLOGY | Facility: CLINIC | Age: 35
End: 2025-08-18

## 2025-08-19 ENCOUNTER — APPOINTMENT (OUTPATIENT)
Dept: SURGERY | Facility: CLINIC | Age: 35
End: 2025-08-19
Payer: COMMERCIAL

## 2025-08-19 VITALS — WEIGHT: 215 LBS | BODY MASS INDEX: 34.55 KG/M2 | HEIGHT: 66 IN

## 2025-08-19 DIAGNOSIS — R59.0 LOCALIZED ENLARGED LYMPH NODES: ICD-10-CM

## 2025-08-19 DIAGNOSIS — E04.1 NONTOXIC SINGLE THYROID NODULE: ICD-10-CM

## 2025-08-19 DIAGNOSIS — J35.8 OTHER CHRONIC DISEASES OF TONSILS AND ADENOIDS: ICD-10-CM

## 2025-08-19 PROCEDURE — 99214 OFFICE O/P EST MOD 30 MIN: CPT

## 2025-08-19 PROCEDURE — G2211 COMPLEX E/M VISIT ADD ON: CPT | Mod: NC
